# Patient Record
Sex: MALE | Race: WHITE | NOT HISPANIC OR LATINO | Employment: OTHER | URBAN - NONMETROPOLITAN AREA
[De-identification: names, ages, dates, MRNs, and addresses within clinical notes are randomized per-mention and may not be internally consistent; named-entity substitution may affect disease eponyms.]

---

## 2017-03-07 ENCOUNTER — TRANSCRIBE ORDERS (OUTPATIENT)
Dept: LAB | Facility: HOSPITAL | Age: 27
End: 2017-03-07

## 2017-03-07 ENCOUNTER — APPOINTMENT (OUTPATIENT)
Dept: LAB | Facility: HOSPITAL | Age: 27
End: 2017-03-07

## 2017-03-07 DIAGNOSIS — Z00.00 ROUTINE GENERAL MEDICAL EXAMINATION AT A HEALTH CARE FACILITY: Primary | ICD-10-CM

## 2017-03-07 LAB
ALBUMIN SERPL-MCNC: 4.4 G/DL (ref 3.5–5)
ALBUMIN/GLOB SERPL: 1.2 G/DL (ref 1.1–2.5)
ALP SERPL-CCNC: 74 U/L (ref 24–120)
ALT SERPL W P-5'-P-CCNC: 52 U/L (ref 0–54)
ANION GAP SERPL CALCULATED.3IONS-SCNC: 9 MMOL/L (ref 4–13)
AST SERPL-CCNC: 31 U/L (ref 7–45)
AUTO MIXED CELLS #: 0.5 10*3/UL (ref 0.1–2.6)
AUTO MIXED CELLS %: 7.2 % (ref 0.1–24)
BILIRUB SERPL-MCNC: 0.6 MG/DL (ref 0.1–1)
BUN BLD-MCNC: 15 MG/DL (ref 5–21)
BUN/CREAT SERPL: 17.6
CALCIUM SPEC-SCNC: 9.8 MG/DL (ref 8.4–10.4)
CHLORIDE SERPL-SCNC: 103 MMOL/L (ref 98–110)
CHOLEST SERPL-MCNC: 188 MG/DL (ref 130–200)
CO2 SERPL-SCNC: 30 MMOL/L (ref 24–31)
CREAT BLD-MCNC: 0.85 MG/DL (ref 0.5–1.4)
ERYTHROCYTE [DISTWIDTH] IN BLOOD BY AUTOMATED COUNT: 12.8 % (ref 12–15)
GFR SERPL CREATININE-BSD FRML MDRD: 108 ML/MIN/1.73
GLOBULIN UR ELPH-MCNC: 3.6 GM/DL
GLUCOSE BLD-MCNC: 113 MG/DL (ref 70–100)
HCT VFR BLD AUTO: 44.3 % (ref 40–52)
HDLC SERPL-MCNC: 52 MG/DL
HGB BLD-MCNC: 15.2 G/DL (ref 14–18)
LDLC SERPL CALC-MCNC: 118 MG/DL (ref 0–99)
LDLC/HDLC SERPL: 2.27 {RATIO}
LYMPHOCYTES # BLD AUTO: 1.7 10*3/MM3 (ref 0.8–7)
LYMPHOCYTES NFR BLD AUTO: 25.8 % (ref 15–45)
MCH RBC QN AUTO: 31 PG (ref 28–32)
MCHC RBC AUTO-ENTMCNC: 34.3 G/DL (ref 33–36)
MCV RBC AUTO: 90.4 FL (ref 82–95)
NEUTROPHILS # BLD AUTO: 4.5 10*3/MM3 (ref 1.5–8.3)
NEUTROPHILS NFR BLD AUTO: 67 % (ref 39–78)
PLATELET # BLD AUTO: 163 10*3/MM3 (ref 130–400)
PMV BLD AUTO: 10.6 FL (ref 6–12)
POTASSIUM BLD-SCNC: 5.4 MMOL/L (ref 3.5–5.3)
PROT SERPL-MCNC: 8 G/DL (ref 6.3–8.7)
RBC # BLD AUTO: 4.9 10*6/MM3 (ref 4.2–5.4)
SODIUM BLD-SCNC: 142 MMOL/L (ref 135–145)
TRIGL SERPL-MCNC: 89 MG/DL (ref 0–149)
VLDLC SERPL-MCNC: 17.8 MG/DL
WBC NRBC COR # BLD: 6.7 10*3/MM3 (ref 4.8–10.8)

## 2017-03-07 PROCEDURE — 80053 COMPREHEN METABOLIC PANEL: CPT

## 2017-03-07 PROCEDURE — 80061 LIPID PANEL: CPT

## 2017-03-07 PROCEDURE — 36415 COLL VENOUS BLD VENIPUNCTURE: CPT

## 2017-03-07 PROCEDURE — 85025 COMPLETE CBC W/AUTO DIFF WBC: CPT

## 2019-03-06 ENCOUNTER — LAB (OUTPATIENT)
Dept: LAB | Facility: HOSPITAL | Age: 29
End: 2019-03-06

## 2019-03-06 ENCOUNTER — TRANSCRIBE ORDERS (OUTPATIENT)
Dept: LAB | Facility: HOSPITAL | Age: 29
End: 2019-03-06

## 2019-03-06 DIAGNOSIS — R73.9 HYPERGLYCEMIA: ICD-10-CM

## 2019-03-06 DIAGNOSIS — Z00.00 ROUTINE GENERAL MEDICAL EXAMINATION AT HEALTH CARE FACILITY: ICD-10-CM

## 2019-03-06 DIAGNOSIS — R73.9 HYPERGLYCEMIA: Primary | ICD-10-CM

## 2019-03-06 LAB
ALBUMIN SERPL-MCNC: 4.5 G/DL (ref 3.5–5)
ALBUMIN/GLOB SERPL: 1.3 G/DL (ref 1.1–2.5)
ALP SERPL-CCNC: 64 U/L (ref 24–120)
ALT SERPL W P-5'-P-CCNC: 55 U/L (ref 0–54)
ANION GAP SERPL CALCULATED.3IONS-SCNC: 8 MMOL/L (ref 4–13)
AST SERPL-CCNC: 33 U/L (ref 7–45)
AUTO MIXED CELLS #: 0.6 10*3/MM3 (ref 0.1–2.6)
AUTO MIXED CELLS %: 8.1 % (ref 0.1–24)
BILIRUB SERPL-MCNC: 0.5 MG/DL (ref 0.1–1)
BUN BLD-MCNC: 17 MG/DL (ref 5–21)
BUN/CREAT SERPL: 20.5
CALCIUM SPEC-SCNC: 9.9 MG/DL (ref 8.4–10.4)
CHLORIDE SERPL-SCNC: 102 MMOL/L (ref 98–110)
CHOLEST SERPL-MCNC: 222 MG/DL (ref 130–200)
CO2 SERPL-SCNC: 31 MMOL/L (ref 24–31)
CREAT BLD-MCNC: 0.83 MG/DL (ref 0.5–1.4)
ERYTHROCYTE [DISTWIDTH] IN BLOOD BY AUTOMATED COUNT: 13.3 % (ref 12–15)
GFR SERPL CREATININE-BSD FRML MDRD: 110 ML/MIN/1.73
GLOBULIN UR ELPH-MCNC: 3.5 GM/DL
GLUCOSE BLD-MCNC: 113 MG/DL (ref 70–100)
HBA1C MFR BLD: 5.5 %
HCT VFR BLD AUTO: 46.6 % (ref 40–52)
HDLC SERPL-MCNC: 57 MG/DL
HGB BLD-MCNC: 16.1 G/DL (ref 14–18)
LDLC SERPL CALC-MCNC: 143 MG/DL (ref 0–99)
LDLC/HDLC SERPL: 2.51 {RATIO}
LYMPHOCYTES # BLD AUTO: 1.7 10*3/MM3 (ref 0.8–7)
LYMPHOCYTES NFR BLD AUTO: 22.7 % (ref 15–45)
MCH RBC QN AUTO: 31.9 PG (ref 28–32)
MCHC RBC AUTO-ENTMCNC: 34.5 G/DL (ref 33–36)
MCV RBC AUTO: 92.5 FL (ref 82–95)
NEUTROPHILS # BLD AUTO: 5.4 10*3/MM3 (ref 1.5–8.3)
NEUTROPHILS NFR BLD AUTO: 69.2 % (ref 39–78)
PLATELET # BLD AUTO: 160 10*3/MM3 (ref 130–400)
PMV BLD AUTO: 11.2 FL (ref 6–12)
POTASSIUM BLD-SCNC: 5.6 MMOL/L (ref 3.5–5.3)
PROT SERPL-MCNC: 8 G/DL (ref 6.3–8.7)
RBC # BLD AUTO: 5.04 10*6/MM3 (ref 4.2–5.4)
SODIUM BLD-SCNC: 141 MMOL/L (ref 135–145)
TRIGL SERPL-MCNC: 109 MG/DL (ref 0–149)
VLDLC SERPL-MCNC: 21.8 MG/DL
WBC NRBC COR # BLD: 7.7 10*3/MM3 (ref 4.8–10.8)

## 2019-03-06 PROCEDURE — 80061 LIPID PANEL: CPT

## 2019-03-06 PROCEDURE — 83036 HEMOGLOBIN GLYCOSYLATED A1C: CPT

## 2019-03-06 PROCEDURE — 85025 COMPLETE CBC W/AUTO DIFF WBC: CPT

## 2019-03-06 PROCEDURE — 80053 COMPREHEN METABOLIC PANEL: CPT

## 2019-03-06 PROCEDURE — 36415 COLL VENOUS BLD VENIPUNCTURE: CPT

## 2022-04-25 ENCOUNTER — LAB (OUTPATIENT)
Dept: LAB | Facility: HOSPITAL | Age: 32
End: 2022-04-25

## 2022-04-25 ENCOUNTER — OFFICE VISIT (OUTPATIENT)
Dept: FAMILY MEDICINE CLINIC | Facility: CLINIC | Age: 32
End: 2022-04-25

## 2022-04-25 VITALS
BODY MASS INDEX: 42.66 KG/M2 | SYSTOLIC BLOOD PRESSURE: 138 MMHG | WEIGHT: 315 LBS | RESPIRATION RATE: 20 BRPM | DIASTOLIC BLOOD PRESSURE: 80 MMHG | HEIGHT: 72 IN | OXYGEN SATURATION: 98 % | TEMPERATURE: 97.7 F | HEART RATE: 87 BPM

## 2022-04-25 DIAGNOSIS — E66.01 CLASS 3 SEVERE OBESITY DUE TO EXCESS CALORIES WITH SERIOUS COMORBIDITY AND BODY MASS INDEX (BMI) OF 40.0 TO 44.9 IN ADULT: ICD-10-CM

## 2022-04-25 DIAGNOSIS — Z11.59 ENCOUNTER FOR HEPATITIS C SCREENING TEST FOR LOW RISK PATIENT: ICD-10-CM

## 2022-04-25 DIAGNOSIS — R53.83 FATIGUE, UNSPECIFIED TYPE: ICD-10-CM

## 2022-04-25 DIAGNOSIS — Z72.0 TOBACCO USE: ICD-10-CM

## 2022-04-25 DIAGNOSIS — Z23 NEED FOR IMMUNIZATION AGAINST TETANUS ALONE: ICD-10-CM

## 2022-04-25 DIAGNOSIS — Z00.00 WELLNESS EXAMINATION: ICD-10-CM

## 2022-04-25 DIAGNOSIS — R73.09 ELEVATED GLUCOSE: Primary | ICD-10-CM

## 2022-04-25 DIAGNOSIS — R73.09 ELEVATED GLUCOSE: ICD-10-CM

## 2022-04-25 DIAGNOSIS — Z00.00 WELLNESS EXAMINATION: Primary | ICD-10-CM

## 2022-04-25 LAB
ALBUMIN SERPL-MCNC: 4.6 G/DL (ref 3.5–5)
ALBUMIN/GLOB SERPL: 1.4 G/DL (ref 1.1–2.5)
ALP SERPL-CCNC: 93 U/L (ref 24–120)
ALT SERPL W P-5'-P-CCNC: 81 U/L (ref 0–50)
ANION GAP SERPL CALCULATED.3IONS-SCNC: 4 MMOL/L (ref 4–13)
AST SERPL-CCNC: 40 U/L (ref 7–45)
AUTO MIXED CELLS #: 0.6 10*3/MM3 (ref 0.1–2.6)
AUTO MIXED CELLS %: 6.9 % (ref 0.1–24)
BILIRUB SERPL-MCNC: 0.6 MG/DL (ref 0.1–1)
BILIRUB UR QL STRIP: NEGATIVE
BUN SERPL-MCNC: 22 MG/DL (ref 5–21)
BUN/CREAT SERPL: 24.4
CALCIUM SPEC-SCNC: 10.1 MG/DL (ref 8.4–10.4)
CHLORIDE SERPL-SCNC: 105 MMOL/L (ref 98–110)
CHOLEST SERPL-MCNC: 232 MG/DL (ref 130–200)
CLARITY UR: CLEAR
CO2 SERPL-SCNC: 33 MMOL/L (ref 24–31)
COLOR UR: YELLOW
CREAT SERPL-MCNC: 0.9 MG/DL (ref 0.5–1.4)
EGFRCR SERPLBLD CKD-EPI 2021: 115.7 ML/MIN/1.73
ERYTHROCYTE [DISTWIDTH] IN BLOOD BY AUTOMATED COUNT: 13.1 % (ref 12.3–15.4)
GLOBULIN UR ELPH-MCNC: 3.4 GM/DL
GLUCOSE SERPL-MCNC: 134 MG/DL (ref 70–100)
GLUCOSE UR STRIP-MCNC: NEGATIVE MG/DL
HBA1C MFR BLD: 5.6 % (ref 4.8–5.9)
HCT VFR BLD AUTO: 47.1 % (ref 37.5–51)
HDLC SERPL-MCNC: 55 MG/DL
HGB BLD-MCNC: 15.6 G/DL (ref 13–17.7)
HGB UR QL STRIP.AUTO: NEGATIVE
KETONES UR QL STRIP: NEGATIVE
LDLC SERPL CALC-MCNC: 144 MG/DL (ref 0–99)
LDLC/HDLC SERPL: 2.56 {RATIO}
LEUKOCYTE ESTERASE UR QL STRIP.AUTO: NEGATIVE
LYMPHOCYTES # BLD AUTO: 1.8 10*3/MM3 (ref 0.7–3.1)
LYMPHOCYTES NFR BLD AUTO: 20.1 % (ref 19.6–45.3)
MCH RBC QN AUTO: 31.8 PG (ref 26.6–33)
MCHC RBC AUTO-ENTMCNC: 33.1 G/DL (ref 31.5–35.7)
MCV RBC AUTO: 95.9 FL (ref 79–97)
NEUTROPHILS NFR BLD AUTO: 6.5 10*3/MM3 (ref 1.7–7)
NEUTROPHILS NFR BLD AUTO: 73 % (ref 42.7–76)
NITRITE UR QL STRIP: NEGATIVE
PH UR STRIP.AUTO: 6 [PH] (ref 5–8)
PLATELET # BLD AUTO: 179 10*3/MM3 (ref 140–450)
PMV BLD AUTO: 10.9 FL (ref 6–12)
POTASSIUM SERPL-SCNC: 5.2 MMOL/L (ref 3.5–5.3)
PROT SERPL-MCNC: 8 G/DL (ref 6.3–8.7)
PROT UR QL STRIP: NEGATIVE
RBC # BLD AUTO: 4.91 10*6/MM3 (ref 4.14–5.8)
SODIUM SERPL-SCNC: 142 MMOL/L (ref 135–145)
SP GR UR STRIP: 1.02 (ref 1–1.03)
TRIGL SERPL-MCNC: 182 MG/DL (ref 0–149)
UROBILINOGEN UR QL STRIP: NORMAL
VLDLC SERPL-MCNC: 33 MG/DL (ref 5–40)
WBC NRBC COR # BLD: 8.9 10*3/MM3 (ref 3.4–10.8)

## 2022-04-25 PROCEDURE — 90715 TDAP VACCINE 7 YRS/> IM: CPT | Performed by: NURSE PRACTITIONER

## 2022-04-25 PROCEDURE — 2014F MENTAL STATUS ASSESS: CPT | Performed by: NURSE PRACTITIONER

## 2022-04-25 PROCEDURE — 85025 COMPLETE CBC W/AUTO DIFF WBC: CPT

## 2022-04-25 PROCEDURE — 80061 LIPID PANEL: CPT

## 2022-04-25 PROCEDURE — 81003 URINALYSIS AUTO W/O SCOPE: CPT

## 2022-04-25 PROCEDURE — 99385 PREV VISIT NEW AGE 18-39: CPT | Performed by: NURSE PRACTITIONER

## 2022-04-25 PROCEDURE — 84403 ASSAY OF TOTAL TESTOSTERONE: CPT

## 2022-04-25 PROCEDURE — 36415 COLL VENOUS BLD VENIPUNCTURE: CPT

## 2022-04-25 PROCEDURE — DOTPHY: Performed by: NURSE PRACTITIONER

## 2022-04-25 PROCEDURE — 83036 HEMOGLOBIN GLYCOSYLATED A1C: CPT

## 2022-04-25 PROCEDURE — 80053 COMPREHEN METABOLIC PANEL: CPT

## 2022-04-25 PROCEDURE — 90471 IMMUNIZATION ADMIN: CPT | Performed by: NURSE PRACTITIONER

## 2022-04-25 PROCEDURE — 84402 ASSAY OF FREE TESTOSTERONE: CPT

## 2022-04-25 NOTE — PROGRESS NOTES
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 Needing labs and screenings completed.            I have reviewed and agree with the HPI information as above.  Mely Raymundo, APRN     Objective   Vital Signs:   /80   Pulse 87   Temp 97.7 °F (36.5 °C)   Resp 20   Ht 182.9 cm (72\")   Wt (!) 143 kg (316 lb)   SpO2 98%   BMI 42.86 kg/m²     Patient's Body mass index is 42.86 kg/m². indicating that he is morbidly obese (BMI > 40 or > 35 with obesity - related health condition). Obesity-related health conditions include the following: none. Obesity is newly identified. BMI is is above average; BMI management plan is completed. We discussed portion control and increasing exercise..      Physical Exam  Vitals and nursing note reviewed.   Constitutional:       Appearance: Normal appearance. He is well-developed.   HENT:      Head: Normocephalic and atraumatic.      Right Ear: Tympanic membrane, ear canal and external ear normal.      Left Ear: Tympanic membrane, ear canal and external ear normal.      Nose: Nose normal. No septal deviation, nasal tenderness or congestion.      Mouth/Throat:      Lips: Pink. No lesions.      Mouth: Mucous membranes are moist. No oral lesions.      Dentition: Normal dentition.      Pharynx: Oropharynx is clear. No pharyngeal swelling, oropharyngeal exudate or posterior oropharyngeal erythema.   Eyes:      General: Lids are normal. Vision grossly intact. No scleral icterus.        Right eye: No discharge.         Left eye: No discharge.      Extraocular Movements: Extraocular movements intact.      Conjunctiva/sclera: Conjunctivae normal.      Right eye: Right conjunctiva is not injected.      Left eye: Left conjunctiva is not injected.      Pupils: Pupils are equal, round, and reactive to light. "   Neck:      Thyroid: No thyroid mass.      Trachea: Trachea normal.   Cardiovascular:      Rate and Rhythm: Normal rate and regular rhythm.      Heart sounds: Normal heart sounds. No murmur heard.    No gallop.   Pulmonary:      Effort: Pulmonary effort is normal.      Breath sounds: Normal breath sounds and air entry. No wheezing, rhonchi or rales.   Musculoskeletal:         General: No tenderness or deformity. Normal range of motion.      Cervical back: Full passive range of motion without pain, normal range of motion and neck supple.      Thoracic back: Normal.      Right lower leg: No edema.      Left lower leg: No edema.   Skin:     General: Skin is warm and dry.      Coloration: Skin is not jaundiced.      Findings: No rash.   Neurological:      Mental Status: He is alert and oriented to person, place, and time.      Cranial Nerves: Cranial nerves are intact.      Sensory: Sensation is intact.      Motor: Motor function is intact.      Coordination: Coordination is intact.      Gait: Gait is intact.      Deep Tendon Reflexes: Reflexes are normal and symmetric.   Psychiatric:         Mood and Affect: Mood and affect normal.         Behavior: Behavior normal.         Judgment: Judgment normal.          KARAN-7:      PHQ-2 Depression Screening  Little interest or pleasure in doing things? 0-->not at all   Feeling down, depressed, or hopeless? 0-->not at all   PHQ-2 Total Score 0     PHQ-9 Depression Screening  Little interest or pleasure in doing things? 0-->not at all   Feeling down, depressed, or hopeless? 0-->not at all   Trouble falling or staying asleep, or sleeping too much?     Feeling tired or having little energy?     Poor appetite or overeating?     Feeling bad about yourself - or that you are a failure or have let yourself or your family down?     Trouble concentrating on things, such as reading the newspaper or watching television?     Moving or speaking so slowly that other people could have noticed?  Or the opposite - being so fidgety or restless that you have been moving around a lot more than usual?     Thoughts that you would be better off dead, or of hurting yourself in some way?     PHQ-9 Total Score 0   If you checked off any problems, how difficult have these problems made it for you to do your work, take care of things at home, or get along with other people?        Result Review  Data Reviewed:                   Assessment and Plan      Problem List Items Addressed This Visit    None     Visit Diagnoses     Wellness examination    -  Primary    Relevant Orders    CBC Auto Differential (Completed)    Comprehensive Metabolic Panel (Completed)    Lipid Panel (Completed)    TSH    Urinalysis With Culture If Indicated - Urine, Clean Catch (Completed)    Tobacco use        Encounter for hepatitis C screening test for low risk patient        Relevant Orders    Hepatitis C antibody    Fatigue, unspecified type        Relevant Orders    Testosterone, Free, Total (Completed)    Need for immunization against tetanus alone        Class 3 severe obesity due to excess calories with serious comorbidity and body mass index (BMI) of 40.0 to 44.9 in adult (HCC)          Yearly exam and DOT physical completed.   Labs today.   Cleared for 2 years.   Patient would like his testosterone checked.         Follow Up   Return in about 1 year (around 4/25/2023) for Annual physical.  Patient was given instructions and counseling regarding his condition or for health maintenance advice. Please see specific information pulled into the AVS if appropriate.     The following counseling and eduction was discussed with the patient and will print with AVS.    Anticipatory Guidance/Psychosocial Screening - to include:  • Second hand smoke  • Tobacco Use counseling  • Substance abuse counseling  • Exercise   • At least 800-1,000 units of vitamin D daily and consideration of screening in persons with low sun exposure or other risk    factors  • 1,200 mg. of calcium daily in adults 50 years and older.   • Folic acid (0.4mg to 0.8 mg/day for females of reproductive age (USPSTF - A Recommendation) The USPSTF   recommends that all women who are planning or capable of pregnancy take a daily supplement containing 0.4 to 0.8 mg   (400 to 800 µg) of folic acid.     • Aspirin use -  for the primary prevention of cardiovascular   disease (CVD) and colorectal cancer (CRC) in adults aged 50 to 59 years who have a 10% or greater 10-year CVD risk, are   not at increased risk for bleeding, have a life expectancy of at least 10 years, and are willing to take low-dose aspirin daily   for at least 10 years.   • Discussion of risks and benefits of hormone replacement prophylaxis and alternative therapies in women  • Polypharmacy  • Safe sex/STD High-intensity behavioral counseling to prevent sexually transmitted infections for all adults at increased   risk for STIs. “High- intensity” behavior counseling is defined by USPSTF as multiple sessions of behavioral counseling   providing some provision of education, skill training or support from changes in sexual behavior that promotes risk   reduction and avoidance. USPSTF - B Recommendation  • Limit exposure of UV light  • Oral health  .•Counseling for Diet  Safety Issues - to include:  Anticipatory Guidance/Safety Issues References  • Domestic Violence: The U.S. Preventive Services Task Force (USPSTF) recommends that clinicians screen women of   childbearing age for intimate partner violence (IPV), such as domestic violence, and provide or refer women who screen   positive to intervention services. This recommendation applies to women who do not have signs or symptoms of abuse.   • Woman Abuse Screening Tool (WAST)   • Personalized Safety Plan   • Smoke and carbon monoxide detectors  • Firearms use and safe storage of  • Appropriate protective/safety equipment for such activities as biking, skating and skiing  •  Seat belt use    Patient was given instructions and counseling regarding his/her condition or for health maintenance advice. Please see specific information pulled into the AVS if appropriate.

## 2022-04-25 NOTE — PROGRESS NOTES
After obtaining consent, and per orders of Leisa WELCH, injection of Tdap 0.5ml administered to R deltoid IM. Given by Cherelle Marie MA. Patient instructed to remain in clinic for 20 minutes afterwards, and to report any adverse reaction to me immediately. Pt tolerated well with no adverse reactions.

## 2022-04-28 LAB
TESTOST FREE SERPL-MCNC: 11.6 PG/ML (ref 8.7–25.1)
TESTOST SERPL-MCNC: 372 NG/DL (ref 264–916)

## 2022-05-03 ENCOUNTER — TELEPHONE (OUTPATIENT)
Dept: FAMILY MEDICINE CLINIC | Facility: CLINIC | Age: 32
End: 2022-05-03

## 2022-05-03 DIAGNOSIS — E78.00 HYPERCHOLESTEREMIA: Primary | ICD-10-CM

## 2022-05-03 RX ORDER — ATORVASTATIN CALCIUM 10 MG/1
10 TABLET, FILM COATED ORAL DAILY
Qty: 30 TABLET | Refills: 2 | Status: SHIPPED | OUTPATIENT
Start: 2022-05-03

## 2022-05-03 NOTE — TELEPHONE ENCOUNTER
----- Message from YURI Huertas sent at 5/3/2022  9:34 AM CDT -----  HgbA1c is normal  Cholesterol  and trigs are elevated. Start lipitor 10 mg daily  Very mild renal insufficiency. Hydrate and hold NSAIDS if he is taking any. Repeat labs in 1 month  Testosterone is low normal. If he is wanting injections, we need to due the full testosterone panel. He can do this with his CMP recheck if he likes. Tell him to do labs 1st thing in the morning.

## 2022-05-03 NOTE — TELEPHONE ENCOUNTER
Called patient with results HgbA1c is normal.Cholesterol  and trigs are elevated. Start lipitor 10 mg daily. Prescription sent to pharmacy.Very mild renal insufficiency. Hydrate and hold NSAIDS Pt states he is currently taking NSAIDS but will stop and agrees to repeat labs in 1 month. Orders in.Testosterone is low normal. If he is wanting injections, we need to due the full testosterone panel. He states he does not wish to do the testosterone panel at this time. NIA.

## 2022-06-16 ENCOUNTER — TELEPHONE (OUTPATIENT)
Dept: FAMILY MEDICINE CLINIC | Facility: CLINIC | Age: 32
End: 2022-06-16

## 2022-06-16 NOTE — TELEPHONE ENCOUNTER
Spoke with pt in regards to outstanding lab orders. States he will come in a few weeks to complete.

## 2022-07-13 ENCOUNTER — TELEPHONE (OUTPATIENT)
Dept: FAMILY MEDICINE CLINIC | Facility: CLINIC | Age: 32
End: 2022-07-13

## 2023-02-02 ENCOUNTER — HOSPITAL ENCOUNTER (INPATIENT)
Age: 33
LOS: 1 days | Discharge: HOME | DRG: 101 | End: 2023-02-03
Attending: INTERNAL MEDICINE | Admitting: HOSPITALIST

## 2023-02-02 ENCOUNTER — APPOINTMENT (OUTPATIENT)
Dept: GENERAL RADIOLOGY | Age: 33
DRG: 101 | End: 2023-02-02
Attending: INTERNAL MEDICINE

## 2023-02-02 ENCOUNTER — APPOINTMENT (OUTPATIENT)
Dept: MRI IMAGING | Age: 33
DRG: 101 | End: 2023-02-02
Attending: INTERNAL MEDICINE

## 2023-02-02 DIAGNOSIS — Z86.69 HISTORY OF SEIZURE DISORDER: Primary | ICD-10-CM

## 2023-02-02 PROBLEM — R41.0 CONFUSION: Status: ACTIVE | Noted: 2023-02-02

## 2023-02-02 PROBLEM — R41.82 ALTERED MENTAL STATUS: Status: ACTIVE | Noted: 2023-02-02

## 2023-02-02 PROBLEM — M54.9 UPPER BACK PAIN: Status: ACTIVE | Noted: 2023-02-02

## 2023-02-02 LAB
ALBUMIN SERPL-MCNC: 4 G/DL (ref 3.5–5.2)
ALP BLD-CCNC: 68 U/L (ref 40–130)
ALT SERPL-CCNC: 27 U/L (ref 5–41)
ANION GAP SERPL CALCULATED.3IONS-SCNC: 9 MMOL/L (ref 7–19)
AST SERPL-CCNC: 22 U/L (ref 5–40)
BILIRUB SERPL-MCNC: <0.2 MG/DL (ref 0.2–1.2)
BUN BLDV-MCNC: 11 MG/DL (ref 6–20)
CALCIUM SERPL-MCNC: 9 MG/DL (ref 8.6–10)
CHLORIDE BLD-SCNC: 105 MMOL/L (ref 98–111)
CO2: 24 MMOL/L (ref 22–29)
CREAT SERPL-MCNC: 0.6 MG/DL (ref 0.5–1.2)
GFR SERPL CREATININE-BSD FRML MDRD: >60 ML/MIN/{1.73_M2}
GLUCOSE BLD-MCNC: 99 MG/DL (ref 74–109)
HCT VFR BLD CALC: 44 % (ref 42–52)
HEMOGLOBIN: 14.9 G/DL (ref 14–18)
LACTIC ACID: 1.1 MMOL/L (ref 0.5–1.9)
MAGNESIUM: 2 MG/DL (ref 1.6–2.6)
MCH RBC QN AUTO: 32.2 PG (ref 27–31)
MCHC RBC AUTO-ENTMCNC: 33.9 G/DL (ref 33–37)
MCV RBC AUTO: 95 FL (ref 80–94)
PDW BLD-RTO: 13.3 % (ref 11.5–14.5)
PLATELET # BLD: 208 K/UL (ref 130–400)
PMV BLD AUTO: 11.5 FL (ref 9.4–12.4)
POTASSIUM REFLEX MAGNESIUM: 3.9 MMOL/L (ref 3.5–5)
RBC # BLD: 4.63 M/UL (ref 4.7–6.1)
SODIUM BLD-SCNC: 138 MMOL/L (ref 136–145)
TOTAL PROTEIN: 6.9 G/DL (ref 6.6–8.7)
TROPONIN: <0.01 NG/ML (ref 0–0.03)
TSH SERPL DL<=0.05 MIU/L-ACNC: 0.73 UIU/ML (ref 0.27–4.2)
WBC # BLD: 13.2 K/UL (ref 4.8–10.8)

## 2023-02-02 PROCEDURE — 83605 ASSAY OF LACTIC ACID: CPT

## 2023-02-02 PROCEDURE — 6370000000 HC RX 637 (ALT 250 FOR IP): Performed by: PSYCHIATRY & NEUROLOGY

## 2023-02-02 PROCEDURE — 84484 ASSAY OF TROPONIN QUANT: CPT

## 2023-02-02 PROCEDURE — 84443 ASSAY THYROID STIM HORMONE: CPT

## 2023-02-02 PROCEDURE — 93880 EXTRACRANIAL BILAT STUDY: CPT

## 2023-02-02 PROCEDURE — G0378 HOSPITAL OBSERVATION PER HR: HCPCS

## 2023-02-02 PROCEDURE — A9577 INJ MULTIHANCE: HCPCS | Performed by: PSYCHIATRY & NEUROLOGY

## 2023-02-02 PROCEDURE — G0379 DIRECT REFER HOSPITAL OBSERV: HCPCS

## 2023-02-02 PROCEDURE — 99223 1ST HOSP IP/OBS HIGH 75: CPT | Performed by: PSYCHIATRY & NEUROLOGY

## 2023-02-02 PROCEDURE — 36415 COLL VENOUS BLD VENIPUNCTURE: CPT

## 2023-02-02 PROCEDURE — 83735 ASSAY OF MAGNESIUM: CPT

## 2023-02-02 PROCEDURE — 85027 COMPLETE CBC AUTOMATED: CPT

## 2023-02-02 PROCEDURE — 70553 MRI BRAIN STEM W/O & W/DYE: CPT | Performed by: RADIOLOGY

## 2023-02-02 PROCEDURE — 93005 ELECTROCARDIOGRAM TRACING: CPT | Performed by: NURSE PRACTITIONER

## 2023-02-02 PROCEDURE — 95819 EEG AWAKE AND ASLEEP: CPT | Performed by: PSYCHIATRY & NEUROLOGY

## 2023-02-02 PROCEDURE — 70553 MRI BRAIN STEM W/O & W/DYE: CPT

## 2023-02-02 PROCEDURE — 6360000002 HC RX W HCPCS: Performed by: NURSE PRACTITIONER

## 2023-02-02 PROCEDURE — 72070 X-RAY EXAM THORAC SPINE 2VWS: CPT | Performed by: RADIOLOGY

## 2023-02-02 PROCEDURE — 80053 COMPREHEN METABOLIC PANEL: CPT

## 2023-02-02 PROCEDURE — 6360000004 HC RX CONTRAST MEDICATION: Performed by: PSYCHIATRY & NEUROLOGY

## 2023-02-02 PROCEDURE — 1210000000 HC MED SURG R&B

## 2023-02-02 PROCEDURE — 72070 X-RAY EXAM THORAC SPINE 2VWS: CPT

## 2023-02-02 RX ORDER — POLYETHYLENE GLYCOL 3350 17 G/17G
17 POWDER, FOR SOLUTION ORAL DAILY PRN
Status: DISCONTINUED | OUTPATIENT
Start: 2023-02-02 | End: 2023-02-03 | Stop reason: HOSPADM

## 2023-02-02 RX ORDER — ASPIRIN 81 MG/1
81 TABLET ORAL DAILY
Status: DISCONTINUED | OUTPATIENT
Start: 2023-02-03 | End: 2023-02-03 | Stop reason: HOSPADM

## 2023-02-02 RX ORDER — LEVETIRACETAM 500 MG/1
500 TABLET ORAL 2 TIMES DAILY
Status: DISCONTINUED | OUTPATIENT
Start: 2023-02-02 | End: 2023-02-03 | Stop reason: HOSPADM

## 2023-02-02 RX ORDER — ONDANSETRON 2 MG/ML
4 INJECTION INTRAMUSCULAR; INTRAVENOUS EVERY 6 HOURS PRN
Status: DISCONTINUED | OUTPATIENT
Start: 2023-02-02 | End: 2023-02-03 | Stop reason: HOSPADM

## 2023-02-02 RX ORDER — ENOXAPARIN SODIUM 100 MG/ML
40 INJECTION SUBCUTANEOUS DAILY
Status: DISCONTINUED | OUTPATIENT
Start: 2023-02-02 | End: 2023-02-02 | Stop reason: DRUGHIGH

## 2023-02-02 RX ORDER — ASPIRIN 300 MG/1
300 SUPPOSITORY RECTAL DAILY
Status: DISCONTINUED | OUTPATIENT
Start: 2023-02-03 | End: 2023-02-03 | Stop reason: HOSPADM

## 2023-02-02 RX ORDER — LORAZEPAM 2 MG/ML
1 INJECTION INTRAMUSCULAR EVERY 5 MIN PRN
Status: DISCONTINUED | OUTPATIENT
Start: 2023-02-02 | End: 2023-02-03 | Stop reason: HOSPADM

## 2023-02-02 RX ORDER — ENOXAPARIN SODIUM 100 MG/ML
30 INJECTION SUBCUTANEOUS 2 TIMES DAILY
Status: DISCONTINUED | OUTPATIENT
Start: 2023-02-02 | End: 2023-02-03 | Stop reason: HOSPADM

## 2023-02-02 RX ORDER — KETOROLAC TROMETHAMINE 30 MG/ML
30 INJECTION, SOLUTION INTRAMUSCULAR; INTRAVENOUS ONCE
Status: COMPLETED | OUTPATIENT
Start: 2023-02-02 | End: 2023-02-02

## 2023-02-02 RX ORDER — ONDANSETRON 4 MG/1
4 TABLET, ORALLY DISINTEGRATING ORAL EVERY 8 HOURS PRN
Status: DISCONTINUED | OUTPATIENT
Start: 2023-02-02 | End: 2023-02-03 | Stop reason: HOSPADM

## 2023-02-02 RX ADMIN — KETOROLAC TROMETHAMINE 30 MG: 30 INJECTION, SOLUTION INTRAMUSCULAR; INTRAVENOUS at 18:06

## 2023-02-02 RX ADMIN — ENOXAPARIN SODIUM 30 MG: 100 INJECTION SUBCUTANEOUS at 18:06

## 2023-02-02 RX ADMIN — GADOBENATE DIMEGLUMINE 20 ML: 529 INJECTION, SOLUTION INTRAVENOUS at 17:53

## 2023-02-02 RX ADMIN — LEVETIRACETAM 500 MG: 500 TABLET, FILM COATED ORAL at 20:48

## 2023-02-02 ASSESSMENT — PAIN - FUNCTIONAL ASSESSMENT: PAIN_FUNCTIONAL_ASSESSMENT: ACTIVITIES ARE NOT PREVENTED

## 2023-02-02 ASSESSMENT — PAIN SCALES - GENERAL: PAINLEVEL_OUTOF10: 6

## 2023-02-02 ASSESSMENT — PAIN DESCRIPTION - ORIENTATION: ORIENTATION: UPPER

## 2023-02-02 ASSESSMENT — PAIN DESCRIPTION - DESCRIPTORS: DESCRIPTORS: ACHING

## 2023-02-02 ASSESSMENT — PAIN DESCRIPTION - LOCATION: LOCATION: BACK

## 2023-02-02 NOTE — PROGRESS NOTES
4 Eyes Skin Assessment    Rogelio Roe is being assessed upon: Admission    I agree that I, Aurelio Sever, RN, along with Christie Palomino (either 2 RN's or 1 LPN and 1 RN) have performed a thorough Head to Toe Skin Assessment on the patient. ALL assessment sites listed below have been assessed. Areas assessed by both nurses:     [x]   Head, Face, and Ears   [x]   Shoulders, Back, and Chest  [x]   Arms, Elbows, and Hands   [x]   Coccyx, Sacrum, and Ischium  [x]   Legs, Feet, and Heels    Does the Patient have Skin Breakdown?  No    Angel Prevention initiated: No  Wound Care Orders initiated: No    WO nurse consulted for Pressure Injury (Stage 3,4, Unstageable, DTI, NWPT, and Complex wounds) and New or Established Ostomies: No        Primary Nurse eSignature: Aurelio Sever, RN on 2/2/2023 at 2:50 PM      Co-Signer eSignature: {Esignature:[id number]}

## 2023-02-02 NOTE — PROGRESS NOTES
Automatic Dose Adjustment of                Subcutaneous Anticoagulant for Prophylaxis    Artice Staff is a 34 y.o. male. Recent Labs     02/02/23  1431   CREATININE 0.6       Estimated Creatinine Clearance: 240 mL/min (based on SCr of 0.6 mg/dL). Weight:  Wt Readings from Last 1 Encounters:   02/02/23 278 lb 4.8 oz (126.2 kg)           Pharmacy has adjusted subcutaneous anticoagulant for prophylaxis to Enoxaparin 30 mg SC twice daily based on the patient's weight and estimated CrCl per [facility] policy.                Electronically signed by Rahul Olivarez, [address] [place] [address] on 2/2/2023 at 4:48 PM

## 2023-02-02 NOTE — PROGRESS NOTES
STROKE ADMISSION    Date of Note:  2/2/2023  Time of Note:  2:51 PM    Patient Name:  Kadie Arce  MRN:  [id number]  YOB: 1990  Age:      34 y.o. Gender:      male    Room:  [address] [place] [address]   Adm. Date: 2/2/2023  Adm. Status:   Allergies: Patient has no known allergies. Code Status: Full Code    Adm. Provider: Cody Ernst MD  Neuro. Provider: Dr. Kizzy Elizalde    Bedside report and handoff assessment completed with Kunal Wolfe RN. Presentation(s)    ED Chief Complaint  No chief complaint on file. ED Impression  No diagnosis found. Admission Impression  Principal Problem:    Altered mental status  Active Problems:    History of seizure disorder  Resolved Problems:    * No resolved hospital problems. *                  Last Known Well Date & Time         Current NIHSS:       Current GCS:  [place] Coma Scale  Eye Opening: Spontaneous  Best Verbal Response: Oriented  Best Motor Response: Obeys commands  [place] Coma Scale Score: 15      Education & Care Plan    [x]    Education Assessment Completed      Patient preferred method of education:   [x]    Visual   [x]    Written   []    Verbal   []    Demonstration   [x]    All of the above    [x]    Individualized Stroke/TIA Education template added, including patient specific risk factors: Overweight    [x]    Stroke education initiated with patient and/or caregiver. [x]    Stroke booklet given and reviewed completely and efficiently with patient and/or caregiver. All questions and concerns addressed. Will continue to educate appropriately.     [x]    Individualized Stroke/TIA Care Plan added      [place] Swallow Screen (YSS)    [x]    Bedside swallow screen completed using the YSS Protocol, and documented prior to any PO meds, food, or drink:     []    Completed in ED    [x]    Passed    []    Failed and awaiting SLP eval     []    Completed upon admission to this unit    []    Passed    []    Failed and awaiting SLP eval     []    Patient strict NPO status for procedure/testing      [x]  NIHSS on admission              VTE Prophylaxis  (ASA, Plavix and tPA are not VTE prophylaxis)      SCDs   [x]    On   []    Off   []    Refused   []    Contraindicated see MD orders      Medication(s)   [x]    Lovenox   []    Eliquis   []    Heparin   []    Coumadin/Warfarin   []    Other:  n/a   []    Contraindicated see MD orders      [x]    I attest as the admitting nurse that I have completed a thorough stroke assessment and admission on this patient. All checked assessment areas listed above have been addressed and completed.     Nurse eSignature:  Jagjit Shannon RN  Date:   2/2/2023   Time:   2:51 PM

## 2023-02-02 NOTE — PROCEDURES
ADULT INPATIENT ELECTROENCEPHALOGRAM REPORT    Patient:   Ginger Smith  MR#:    [id number]  Room #:    INPATIENT  YOB: 1990  Date of Evaluation:  2/2/2023  Referring Physician:   Adriano Segal DO      CLINICAL INFORMATION:     This patient is a 34 y.o. male with a history of possible seizure. MEDICATIONS:     See MAR. RECORDING CONDITIONS:     This EEG was performed utilizing standard International 10-20 System of electrode placement, with additional channels monitored for eye movement. One channel electrocardiogram was monitored. Data was obtained, stored, and interpreted according to ACNS guidelines (J Clin Neurophysiol 2006;23(2):) utilizing referential montage recording, with reformatting to longitudinal, transverse bipolar, and referential montages as necessary for interpretation, along with the digital/automated EEG analysis. Patient tolerated entire procedure well. Photic stimulation and hyperventilation were utilized as activation procedures unless otherwise specified below. E.E.G. DESCRIPTION:     The resting predominant posterior background frequency is a 9-10 Hz 30-40 uV rhythm. Intermittent generalized frontally predominant 3 to 4 Hz spike and polyspike and wave discharges were noted. Intermixed sleep architecture was noted. Hyperventilation was not performed. Photic stimulation was not performed. No ECG abnormalities were noted. Muscle, motion, and eye movement artifacts were noted. EEG INTERPRETATION:    Abnormal EEG due to intermittent frontally predominant generalized 3 to 4 Hz spike and polyspike and wave discharges. 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        Adriano Segal DO  Board Certified Neurologist    Date reported: 2/2/2023  Date signed: 2/2/2023

## 2023-02-02 NOTE — CONSULTS
[id number] [facility] Neurology Consult      Patient:   Betty Parra  MR#:    [id number]  Account Number:                   [id number]      Room:    [address] [place] [address]   YOB: 1990  Date of Progress Note: 2/2/2023  Time of Note                           4:29 PM  Attending Physician:  Chary Dillon MD  Consulting Physician:  Devyn Celis DO       CHIEF COMPLAINT:  Possible seizure     HISTORY OF PRESENT ILLNESS:   This is a 34 y.o. male who was admitted with a confusional episode. The patient states that he became confused, noted difficulty with his speech. Then he lost basically a block of time where he does not recall what took place. He did appear confused during this but did not exhibit any generalized tonic-clonic activity. He did bite the right side of his tongue during this. There was no incontinence. He does have a prior seizure history remotely during childhood. He is not on antiepileptic medications. He denies a history of traumatic brain injury, meningitis or encephalitis. No family history of seizures. He denies facial drooping, focal weakness or numbness. He denies headaches or visual changes. No significant drug or alcohol history noted. REVIEW OF SYSTEMS:  Constitutional - No fever or chills. HENT -  No Scalp tenderness. No tinnitus or significant hearing loss. No nose bleeding, no sore throat. Eyes - No sudden vision change or eye pain  Respiratory - No significant shortness of breath or cough  Cardiovascular - No chest pain. No palpitations or significant leg swelling  Gastrointestinal - No abdominal swelling or pain. Genitourinary - No difficulty urinating, dysuria  Musculoskeletal - No back pain or myalgia. Skin - No color change or rash  Neurologic - No lateralizing weakness. No numbness. Hematologic - No easy bruising or spontaneous bleeding. Psychiatric - No anxiety.      PAST MEDICAL HISTORY:      Diagnosis Date    Absence seizure disorder ([facility] [address]     Altered mental status        PAST SURGICAL HISTORY:  No past surgical history on file. SOCIAL HISTORY:   TOBACCO:   has no history on file for tobacco use. ETOH:   has no history on file for alcohol use. DRUG:    Social History     Substance and Sexual Activity   Drug Use Not on file       FAMILY HISTORY:   No family history on file. MEDICATIONS:      Current Facility-Administered Medications:     ondansetron (ZOFRAN-ODT) disintegrating tablet 4 mg, 4 mg, Oral, Q8H PRN **OR** ondansetron (ZOFRAN) injection 4 mg, 4 mg, IntraVENous, Q6H PRN, [facility] - CNP    polyethylene glycol (GLYCOLAX) packet 17 g, 17 g, Oral, Daily PRN, [facility]    enoxaparin (LOVENOX) injection 40 mg, 40 mg, SubCUTAneous, Daily, [facility]    [START ON 2/3/2023] aspirin EC tablet 81 mg, 81 mg, Oral, Daily **OR** [START ON 2/3/2023] aspirin suppository 300 mg, 300 mg, Rectal, Daily, [facility]    LORazepam (ATIVAN) injection 1 mg, 1 mg, IntraVENous, Q5 Min PRN, [facility]    ketorolac (TORADOL) injection 30 mg, 30 mg, IntraVENous, Once, [facility]    ALLERGIES:    Patient has no known allergies. PHYSICAL EXAM:    Constitutional -   BP (!) 158/98   Pulse 99   Temp 98.4 °F (36.9 °C) (Temporal)   Resp 16   SpO2 97%   General appearance: No acute distress   EYES -   Conjunctiva normal  Pupillary exam as below, see CN exam in the neurologic exam  ENT-    No scars, masses, or lesions over external nose or ears  Oropharynx without erythema, palate midline  Cardiovascular -   No clubbing, cyanosis, or edema   Pulmonary-   Good expansion, normal effort without use of accessory muscles  Musculoskeletal -   No significant wasting of muscles noted  Gait as below, see gait exam in the neurologic exam  Muscle strength, tone, stability as below see the motor exam in the neurologic exam.   No bony deformities  Skin -   Warm, dry, and intact to inspection and palpation.     No rash, erythema, or pallor  Psychiatric -   Mood, affect, and behavior appear normal    Memory as below see mental status examination in the neurologic exam      NEUROLOGICAL EXAM    Mental status   [x] Awake, alert, oriented   [x] Affect attention and concentration appear appropriate  [x] Recent and remote memory appears unremarkable  [x] Speech normal without dysarthria or aphasia, comprehension and repetition intact. COMMENTS:   Cranial Nerves [x] No VF deficit to confrontation  [x] PERRLA, EOMI, no nystagmus, conjugate eye movements, no ptosis  [x] Face symmetric  [x] Facial sensation intact  [x] Tongue midline no atrophy or fasciculations present  [x] Palate midline, hearing to finger rub normal  [x] Shoulder shrug and SCM testing normal  COMMENTS:   Motor   [x] 5/5 strength x 4 extremities  [x] Normal bulk and tone  [x] No tremor present  [x] No rigidity or bradykinesia noted  COMMENTS:   Sensory  [x] Sensation intact to light touch, pin prick, vibration, and proprioception BLE  [] Sensation intact to light touch, pin prick, vibration, and proprioception BUE  COMMENTS:   Coordination [x] FTN normal bilaterally   [] HTS normal bilaterally  [] SILVIA normal.   COMMENTS:   Reflexes  [x] Symmetric and non-pathological  [x] Toes downgoing bilaterally  [x] No clonus present  COMMENTS:   Gait                  [] Normal steady gait    [] Ataxic    [] Spastic     [] Magnetic     [] Shuffling  [x] Not assessed  COMMENTS:       LABS/IMAGING:    As below and per HPI    Recent Labs     02/02/23  1431   WBC 13.2*   HGB 14.9        Recent Labs     02/02/23  1431      K 3.9      CO2 24   BUN 11   CREATININE 0.6   GLUCOSE 99     Recent Labs     02/02/23  1431   AST 22   ALT 27   BILITOT <0.2   ALKPHOS 68         ASSESSMENT:  34 y.o. admitted with a confusional episode with tongue biting, EEG is abnormal, suspect seizure. Remote history of seizures during childhood, likely has SANA given EEG appearance. PLAN:   MRI brain   Start Keppra 500 mg bid    Epilepsy precautions and seizure first aid discussed. No driving, heights, swimming, tub baths, open flames, or heavy machinery. Ativan prn, seizure precautions. Please feel free to call with any questions. [phone] (cell phone).     Riky Michaels DO  Board Certified Neurology

## 2023-02-02 NOTE — H&P
[address] [facility] - History & Physical      PCP: No primary care provider on file. Date of Admission: 2/2/2023    Date of Service: 2/2/2023    Chief Complaint:  Altered mental status    History Of Present Illness: The patient is a 34 y.o. male who presented to [facility] as a transfer from [facility] for evaluation of altered mental status. Pt has history of absence seizure disorder during childhood. Pt tells me that he is a  having developed confusion during work. He relates that he experienced 30 minutes of loss of time while sitting in a parked vehicle having had tongue numbness after associated with abrasion of right lateral side of tongue. He tells me that tongue numbness has resolved. He has midline upper back discomfort. He denies vision changes as well as lateralizing numbness/weakness. He experienced no incontinence. He tells me that he was diagnosed with petit mal seizure disorder at age 14 treated with Depakote that was discontinued at age 12. He has not had any seizure activity since childhood. He denies fevers as well as recent illness. Pt had CT of head that was reported to be normal at outlying facility and an elevated lactic acid of 3.0 given aspirin and normal saline fluid bolus. Case discussed with hospitalist and neurologist Dr. Beto Castro prior to transfer. Pt is admitted to hospitalist service in consultation with neurology for further evaluation. Past Medical History:    No past medical history on file. Past Surgical History:    No past surgical history on file. Home Medications:  Prior to Admission medications    Not on File       Allergies:    Patient has no allergy information on record. Social History:    The patient currently lives at home  Tobacco:   has no history on file for tobacco use. Alcohol:   has no history on file for alcohol use.   Illicit Drugs: denies    Family History:  No family history on file.    Review of Systems:   Review of Systems   Skin:  Positive for wound (right lateral tongue). Neurological:  Positive for numbness (lingual area-resolved). Psychiatric/Behavioral:  Positive for confusion (resolved). All other systems reviewed and are negative. 14 point review of systems is negative except as specifically addressed above. Physical Examination:  BP (!) 158/98   Pulse 93   Temp 98.4 °F (36.9 °C) (Temporal)   Resp 16   SpO2 97%   Physical Exam  Vitals reviewed. Constitutional:       General: He is not in acute distress. Appearance: Normal appearance. HENT:      Right Ear: External ear normal.      Left Ear: External ear normal.      Mouth/Throat:      Pharynx: Oropharynx is clear. Comments: Superficial abrasion right lateral tongue  Eyes:      Conjunctiva/sclera: Conjunctivae normal.   Cardiovascular:      Rate and Rhythm: Normal rate and regular rhythm. Pulmonary:      Effort: Pulmonary effort is normal.      Breath sounds: Normal breath sounds. Abdominal:      Tenderness: There is no abdominal tenderness. Musculoskeletal:      Cervical back: Neck supple. Comments: Mild tenderness to palpate upper thoracic paraspinal muscles  5/5 MS equal bilateral upper/lower extremities   Skin:     General: Skin is warm and dry. Neurological:      General: No focal deficit present. Mental Status: He is alert and oriented to person, place, and time. Assessment/Plan:  Principal Problem:    Altered mental status  Active Problems:    History of seizure disorder  Resolved Problems:    * No resolved hospital problems.  *     Principal Problem:    Altered mental status/ History of seizure disorder/Upper back pain   -consult neurology   -asa 81mg daily   -ativan prn  -lytes  -blood glucose  -seizure precautions  -neuro consult   -telemetry    -cbc   -cmp   -troponin   -tsh   -lipid panel   -uds   -magnesium    -NIHSS/neuro checks q4hrs   -pt/ot consult   -npo until bedside swallow assessment   -HgA1c   -Tspine imaging   -ekg   -toradol 30mg iv x1dose  Resolved Problems:    * No resolved hospital problems.  *  Signed:  YAQUELIN Cardona - CNP, 2/2/2023 2:25 PM

## 2023-02-03 VITALS
RESPIRATION RATE: 16 BRPM | HEART RATE: 85 BPM | WEIGHT: 278.3 LBS | OXYGEN SATURATION: 97 % | BODY MASS INDEX: 37.69 KG/M2 | DIASTOLIC BLOOD PRESSURE: 89 MMHG | HEIGHT: 72 IN | TEMPERATURE: 97.9 F | SYSTOLIC BLOOD PRESSURE: 135 MMHG

## 2023-02-03 LAB
AMPHETAMINE SCREEN, URINE: NEGATIVE
ANION GAP SERPL CALCULATED.3IONS-SCNC: 10 MMOL/L (ref 7–19)
BARBITURATE SCREEN URINE: NEGATIVE
BENZODIAZEPINE SCREEN, URINE: NEGATIVE
BUN BLDV-MCNC: 14 MG/DL (ref 6–20)
BUPRENORPHINE URINE: NEGATIVE
CALCIUM SERPL-MCNC: 9.2 MG/DL (ref 8.6–10)
CANNABINOID SCREEN URINE: NEGATIVE
CHLORIDE BLD-SCNC: 102 MMOL/L (ref 98–111)
CHOLESTEROL, TOTAL: 187 MG/DL (ref 160–199)
CO2: 27 MMOL/L (ref 22–29)
COCAINE METABOLITE SCREEN URINE: NEGATIVE
CREAT SERPL-MCNC: 1 MG/DL (ref 0.5–1.2)
GFR SERPL CREATININE-BSD FRML MDRD: >60 ML/MIN/{1.73_M2}
GLUCOSE BLD-MCNC: 112 MG/DL (ref 74–109)
HBA1C MFR BLD: 5.6 % (ref 4–6)
HCT VFR BLD CALC: 44.4 % (ref 42–52)
HDLC SERPL-MCNC: 50 MG/DL (ref 55–121)
HEMOGLOBIN: 14.7 G/DL (ref 14–18)
LDL CHOLESTEROL CALCULATED: 117 MG/DL
Lab: NORMAL
MCH RBC QN AUTO: 31.7 PG (ref 27–31)
MCHC RBC AUTO-ENTMCNC: 33.1 G/DL (ref 33–37)
MCV RBC AUTO: 95.9 FL (ref 80–94)
METHADONE SCREEN, URINE: NEGATIVE
METHAMPHETAMINE, URINE: NEGATIVE
OPIATE SCREEN URINE: NEGATIVE
OXYCODONE URINE: NEGATIVE
PDW BLD-RTO: 13.6 % (ref 11.5–14.5)
PHENCYCLIDINE SCREEN URINE: NEGATIVE
PLATELET # BLD: 196 K/UL (ref 130–400)
PMV BLD AUTO: 11.9 FL (ref 9.4–12.4)
POTASSIUM REFLEX MAGNESIUM: 4.6 MMOL/L (ref 3.5–5)
PROPOXYPHENE SCREEN: NEGATIVE
RBC # BLD: 4.63 M/UL (ref 4.7–6.1)
SODIUM BLD-SCNC: 139 MMOL/L (ref 136–145)
TRICYCLIC, URINE: NEGATIVE
TRIGL SERPL-MCNC: 100 MG/DL (ref 0–149)
WBC # BLD: 11.5 K/UL (ref 4.8–10.8)

## 2023-02-03 PROCEDURE — 94760 N-INVAS EAR/PLS OXIMETRY 1: CPT

## 2023-02-03 PROCEDURE — 97110 THERAPEUTIC EXERCISES: CPT

## 2023-02-03 PROCEDURE — 80061 LIPID PANEL: CPT

## 2023-02-03 PROCEDURE — 83036 HEMOGLOBIN GLYCOSYLATED A1C: CPT

## 2023-02-03 PROCEDURE — 99232 SBSQ HOSP IP/OBS MODERATE 35: CPT | Performed by: PSYCHIATRY & NEUROLOGY

## 2023-02-03 PROCEDURE — 80306 DRUG TEST PRSMV INSTRMNT: CPT

## 2023-02-03 PROCEDURE — 92610 EVALUATE SWALLOWING FUNCTION: CPT

## 2023-02-03 PROCEDURE — 80048 BASIC METABOLIC PNL TOTAL CA: CPT

## 2023-02-03 PROCEDURE — 92523 SPEECH SOUND LANG COMPREHEN: CPT

## 2023-02-03 PROCEDURE — 6370000000 HC RX 637 (ALT 250 FOR IP): Performed by: NURSE PRACTITIONER

## 2023-02-03 PROCEDURE — 85027 COMPLETE CBC AUTOMATED: CPT

## 2023-02-03 PROCEDURE — 97165 OT EVAL LOW COMPLEX 30 MIN: CPT

## 2023-02-03 PROCEDURE — 36415 COLL VENOUS BLD VENIPUNCTURE: CPT

## 2023-02-03 PROCEDURE — 95816 EEG AWAKE AND DROWSY: CPT

## 2023-02-03 PROCEDURE — 97161 PT EVAL LOW COMPLEX 20 MIN: CPT

## 2023-02-03 RX ORDER — LEVETIRACETAM 500 MG/1
500 TABLET ORAL 2 TIMES DAILY
Qty: 60 TABLET | Refills: 5 | Status: SHIPPED | OUTPATIENT
Start: 2023-02-03

## 2023-02-03 RX ADMIN — ASPIRIN 81 MG: 81 TABLET, COATED ORAL at 08:35

## 2023-02-03 NOTE — PROGRESS NOTES
Speech Language Pathology  Facility/Department: [facility] SURG SERVICES  Initial Speech/Language/Cognitive/Swallow Assessment    NAME: Jamison Brasher  : 1990   MRN: [id number]  ADMISSION DATE: 2023  ADMITTING DIAGNOSIS: has Altered mental status; History of seizure disorder; Upper back pain; and Confusion on their problem list.    Date of Eval: 2/3/2023   Evaluating Therapist: ONDINA Phan    Pain:  Patient reported tongue swelling and pain. RN notified. Assessment:  Completed assessment. Patient exhibited slow, decreased lingual movements during utterances. No significant deficits were noted in the areas of attention, auditory comprehension, verbal expression, orientation, memory, or reasoning/problem solving. Also evaluated patient's swallowing function. Patient exhibited sluggish laryngeal elevation for swallow airway protection. Even so, no outward S/S penetration/aspiration was observed with any puree consistency trial or thin H2O trial presented during assessment this date. Did not note swallow function with more solid consistencies as patient declined (secondary to reported mouth pain). At this time, would recommend full liquid diet with thin liquids. Self-feed. Thank you for this consult. Subjective:  General  Chart Reviewed: Yes  Patient assessed for rehabilitation services?: Yes  Family / Caregiver Present: No    Objective:  Oral Motor:   Labial ROM: Adequate during labial retraction trials and labial protrusion trials. Labial Strength: Adequate during labial compression trials. Labial Coordination: Adequate movements were noted. Lingual ROM: Decreased during lingual extension trials with no full point achieved; decreased during lingual elevation trials without use of accessory jaw movement; decreased movements noted bilaterally. Lingual Strength: Decreased   Lingual Coordination: Slowed movements were noted.       Auditory Comprehension  Comprehension:  (Patient demonstrated ability to answer yes/no questions of increased complexity and to follow complex 1 and simple 2 step commands at independent level and with adequate processing speed.)    Expression  Primary Mode of Expression:  (Confrontation naming of items in room was considered to be [facility]. Structured responsive speech was considered to be functional. Responses in natural conversation were considered to be timely and appropriate.)    Motor Speech:  (Patient exhibited slow, decreased lingual movements during verbalizations. SLP ranked functional intelligibility of speech for unfamiliar listeners at % in utterances with background noise present.)    Overall Orientation Status:  (Patient demonstrated ability to verbalize name, birthday, age, address, phone number, city, state, hospital, floor, month, PAWAN, year, and situation at independent level.)    Memory:  (Patient demonstrated appropriate immediate memory with sequences of unrelated numbers/words set up to 5 items without repetitions provided. Patient demonstrated appropriate short-term/working memory during assessment.)    Problem Solving:  (Patient verbalized PCP and pharmacy at independent level. Patient verbalized conditions in which he takes medications independently. Patient demonstrated ability to verbalize appropriate solutions to situations that could occur during activities of daily living at independent level.)    Additional Assessments:  Assessed patient's swallowing function. Oral transit of puree consistency trials, presented independently, primarily measured 1-2 seconds in length and min oral cavity residue was noted post swallows; residue cleared from the mouth with additional dry swallows. Oral transit of thin H2O trials, presented independently via straw, primarily measured 1 second in length. Laryngeal elevation during swallow initiation was considered to be sluggish for swallow airway protection.  Even so, no outward S/S penetration/aspiration was observed with any puree consistency trial or thin H2O trial presented during evaluation this date. Did not note swallow function with more solid consistencies as patient declined (secondary to reported mouth pain). At this time, would recommend full liquid diet with thin liquids. Self-feed.      Electronically signed by ONDINA Douglas on 2/3/2023 at 11:30 AM

## 2023-02-03 NOTE — PROGRESS NOTES
AVS given, all questions answered; pt verbalized understanding. Iv removed. All belongings sent with pt.  NAD noted, vss.

## 2023-02-03 NOTE — DISCHARGE SUMMARY
Discharge Summary      Date:2/3/2023        Patient Rian Larkin     YOB: 1990     Age:32 y.o. Admit Date:2/2/2023   Admission Condition:stable   Discharged Condition:stable  Discharge Date: 02/03/23       Discharge Diagnoses   Principal Problem:    Altered mental status  Active Problems:    History of seizure disorder    Upper back pain    Confusion  Resolved Problems:    * No resolved hospital problems. [facility] Stay   Narrative of Hospital Course:     79-year-old male with a history of absence seizure disorder during childhood, presented to the hospital as a transfer from outside facility with concerns of altered mental status consisting of difficulty with speech as well as biting the right side of his tongue with no incontinence. CT head at outside facility was reported to be normal, patient was noted to have elevated lactic acidosis of 3. 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 Was initiated on Keppra 500 mg twice daily, MRI of the brain was obtained without any evidence of acute infarct or abnormality. Was informed about no driving, heights, swimming, tub baths, or complaints of heavy missionary use in the next 6 months. No recurrent episode noted in-house. Discharged home with mother in stable condition follow-up outpatient with PCP for continuous management. Physical Examination:  General: Well-developed, no acute distress lying comfortably in bed. HEENT: Atraumatic normocephalic, range of motion normal, no JVD, no tracheal deviation noted. Cardiac: Normal S1-S2 no murmurs rub or gallop. Respiratory: clear To auscultation bilaterally, no rhonchi or rales, no wheezing  Abdomen: Soft, positive bowel sounds in all quadrants, no distention, nontender to palpation, no organomegaly noted, no rebound noted.     extremities: no tenderness, no edema, moves all extremities  Psych: Affect normal and good eye contact, behavioral normal.      Consultants:   IP CONSULT TO NEUROLOGY    Time Spent on Discharge:  35 minutes were spent in patient examination, evaluation, counseling as well as medication reconciliation, prescriptions for required medications, discharge plan and follow up. Surgeries/Procedures Performed:  NONE       Significant Diagnostic Studies:   Recent Labs:  CBC:   Lab Results   Component Value Date/Time    WBC 11.5 02/03/2023 02:01 AM    RBC 4.63 02/03/2023 02:01 AM    HGB 14.7 02/03/2023 02:01 AM    HCT 44.4 02/03/2023 02:01 AM    MCV 95.9 02/03/2023 02:01 AM    MCH 31.7 02/03/2023 02:01 AM    MCHC 33.1 02/03/2023 02:01 AM    RDW 13.6 02/03/2023 02:01 AM     02/03/2023 02:01 AM     BMP:    Lab Results   Component Value Date/Time    GLUCOSE 112 02/03/2023 02:01 AM     02/03/2023 02:01 AM    K 4.6 02/03/2023 02:01 AM     02/03/2023 02:01 AM    CO2 27 02/03/2023 02:01 AM    ANIONGAP 10 02/03/2023 02:01 AM    BUN 14 02/03/2023 02:01 AM    CREATININE 1.0 02/03/2023 02:01 AM    CALCIUM 9.2 02/03/2023 02:01 AM    LABGLOM >60 02/03/2023 02:01 AM       Radiology Last 7 Days:  XR THORACIC SPINE (2 VIEWS)    Result Date: 2/3/2023  NO EVIDENCE OF FRACTURE OR MALALIGNMENT. MRI BRAIN W WO CONTRAST    Result Date: 2/2/2023  1. No focal cortical abnormality. 2. No abnormal neuroparenchymal/meningeal enhancement. Recommendation: Follow up as clinically indicated. Dictated and Electronically Signed by Kayla Suarez MD at 02-Feb-2023 07:40:29 PM EST               Discharge Plan   Disposition: Home    Provider Follow-Up:   No follow-up provider specified.          Patient Instructions   Diet: regular diet    Activity: activity as tolerated      Discharge Medications         Medication List        START taking these medications      levETIRAcetam 500 MG tablet  Commonly known as: KEPPRA  Take 1 tablet by mouth 2 times daily               Where to Get Your Medications These [id number] - [address] [facility], [address] [facility]  [address]  [address] [facility], [address] [place] [address]      Phone: [phone]   levETIRAcetam 500 MG tablet     Pharmacy Instructions:    [facility] AND [address], [place] [id number] [phone]           Electronically signed by Denice Schulz MD on 2/3/23 at 10:57 AM CST

## 2023-02-03 NOTE — PLAN OF CARE
Problem: Discharge Planning  Goal: Discharge to home or other facility with appropriate resources  2/3/2023 1510 by Samantha Polo RN  Outcome: Adequate for Discharge  2/3/2023 1127 by Samantha Polo RN  Outcome: Adequate for Discharge  Flowsheets (Taken 2/3/2023 7002)  Discharge to home or other facility with appropriate resources: Identify barriers to discharge with patient and caregiver  2/3/2023 0347 by Radha Lynn RN  Outcome: Progressing  Flowsheets  Taken 2/3/2023 0340 by Radha Lynn RN  Discharge to home or other facility with appropriate resources: Identify barriers to discharge with patient and caregiver  Taken 2/2/2023 1437 by Roshan Hernandez RN  Discharge to home or other facility with appropriate resources:   Identify barriers to discharge with patient and caregiver   Identify discharge learning needs (meds, wound care, etc)   Refer to discharge planning if patient needs post-hospital services based on physician order or complex needs related to functional status, cognitive ability or social support system   Arrange for needed discharge resources and transportation as appropriate   Arrange for interpreters to assist at discharge as needed     Problem: Safety - Adult  Goal: Free from fall injury  2/3/2023 1510 by Samantha Polo RN  Outcome: Adequate for Discharge  2/3/2023 1127 by Samantha Polo RN  Outcome: Adequate for Discharge  2/3/2023 0347 by Radha Lynn RN  Outcome: Progressing  Flowsheets (Taken 2/3/2023 0345)  Free From Fall Injury: Instruct family/caregiver on patient safety     Problem: Pain  Goal: Verbalizes/displays adequate comfort level or baseline comfort level  2/3/2023 1510 by Samantha Polo RN  Outcome: Adequate for Discharge  2/3/2023 1127 by Samantha Polo RN  Outcome: Adequate for Discharge  2/3/2023 0347 by Radha Lynn RN  Outcome: Progressing

## 2023-02-03 NOTE — PROGRESS NOTES
Physical Therapy  Facility/Department: [facility] SURG SERVICES  Physical Therapy Initial Assessment    Name: Peña Berger  : 1990  MRN: [id number]  Date of Service: 2/3/2023    Discharge Recommendations:  No therapy recommended at discharge, Home with assist PRN          Patient Diagnosis(es): There were no encounter diagnoses. Past Medical History:  has a past medical history of Absence seizure disorder ([address] and Altered mental status. Past Surgical History:  has no past surgical history on file. Assessment   Assessment: Pt. tolerated mobility well. Pt. safe to mobilize in room indep. No PT needed while in acute care. Pt. seems to be at functional baseline. Treatment Diagnosis: indep mobility and gait  Therapy Prognosis: Excellent  Decision Making: Low Complexity  Barriers to Learning: none  No Skilled PT: At baseline function  Requires PT Follow-Up: No  Activity Tolerance  Activity Tolerance: Patient tolerated treatment well     Plan   Physcial Therapy Plan  General Plan: Discharge with evaluation only  Safety Devices  Type of Devices:  (up ad rosa)     Restrictions  Restrictions/Precautions  Restrictions/Precautions: Seizure     Subjective   Pain: none  General  Chart Reviewed: Yes  Patient assessed for rehabilitation services?: Yes  Response To Previous Treatment: Not applicable  Family / Caregiver Present: No  Referring Practitioner: YAQUELIN Vidal CNP  Referral Date : 23  Diagnosis: AMS, hx of seizure d/o  Follows Commands: Within Functional Limits  General Comment  Comments: RNSanty PT. Subjective  Subjective: Pt. willing to work with therapy. States everything is fine and he wants to get his discharge papers.          Social/Functional History  Social/Functional History  Lives With: Alone  Type of Home: House  ADL Assistance: Independent  Homemaking Assistance: Independent  Ambulation Assistance: Independent  Transfer Assistance: Independent  Active : Yes  Type of Occupation: semi , farmer  Leisure & Hobbies: has dogs  Vision/Hearing  Vision  Vision: Within Functional Limits  Hearing  Hearing: Within functional limits    Cognition   Orientation  Overall Orientation Status: Within Functional Limits  Cognition  Overall Cognitive Status: WFL     Objective   Heart Rate: 85  Heart Rate Source: Monitor  BP: 135/89  BP Location: Left upper arm  MAP (Calculated): 104  Resp: 16  SpO2: 97 %  O2 Device: None (Room air)     Observation/Palpation  Posture: Good  Observation: pt up in chair        AROM RLE (degrees)  RLE AROM: WNL  AROM LLE (degrees)  LLE AROM : WNL  Strength RLE  Strength RLE: WNL  Comment: 5/5  Strength LLE  Strength LLE: WNL  Comment: 5/5           Bed mobility  Supine to Sit: Unable to assess  Sit to Supine: Unable to assess  Bed Mobility Comments: pt already up to recliner  Transfers  Sit to Stand: Independent  Stand to Sit: Independent  Comment: pt donned shoes  Ambulation  Surface: Level tile  Device: No Device  Assistance: Independent  Quality of Gait: steady, fast pace  Distance: 250'     Balance  Posture: Good  Sitting - Static: Good;+  Sitting - Dynamic: Good;+  Standing - Static: Good;+  Standing - Dynamic: Good;+           OutComes Score                                                  AM-PAC Score             Tinneti Score       Goals  Patient Goals   Patient Goals : go home       Education  Patient Education  Education Given To: Patient  Education Provided: Role of Therapy  Education Provided Comments: use of call light for needs  Education Method: Verbal  Barriers to Learning: None  Education Outcome: Verbalized understanding;Demonstrated understanding      Therapy Time   Individual Concurrent Group Co-treatment   Time In           Time Out           Minutes                   Emory Tate PT   Electronically signed by Emory Tate PT on 2/3/2023 at 9:50 AM

## 2023-02-03 NOTE — PROGRESS NOTES
Occupational Therapy  Facility/Department: [address] Initial Assessment    Name: Gilbert Sultana  : 1990  MRN: [id number]  Date of Service: 2/3/2023    Discharge Recommendations:  No follow up OT recommended           Patient Diagnosis(es): There were no encounter diagnoses. Past Medical History:  has a past medical history of Absence seizure disorder ([address] and Altered mental status. Past Surgical History:  has no past surgical history on file. Assessment   Assessment: OT eval only. Pt Ind in ADL and functional mobility. No Skilled OT: Independent with functional mobility; Independent with ADL's  REQUIRES OT FOLLOW-UP: No  Activity Tolerance  Activity Tolerance: Patient Tolerated treatment well        Plan   Occupational Therapy Plan  Additional Comments: eval only     Restrictions  Restrictions/Precautions  Restrictions/Precautions: Seizure    Subjective         Social/Functional History  Social/Functional History  Lives With: Alone  ADL Assistance: Independent  Homemaking Assistance: Independent  Ambulation Assistance: Independent  Transfer Assistance: Independent  Active : Yes  Type of Occupation: semi        Objective   Heart Rate: 85  Heart Rate Source: Monitor  BP: 135/89  BP Location: Left upper arm  MAP (Calculated): 104  Resp: 16  SpO2: 97 %  O2 Device: None (Room air)             Safety Devices  Type of Devices:  (pt up ad rosa)     Toilet Transfers  Toilet - Technique: Ambulating  Toilet Transfer: Independent (per clinical observation of prerequisite skills)     ADL  Feeding: Independent  Grooming: Independent  UE Bathing: Independent  LE Bathing: Independent  UE Dressing: Independent  LE Dressing: Independent  Toileting: Independent           Transfers  Sit to stand: Independent  Stand to sit:  Independent  Vision  Vision: Within Functional Limits  Hearing  Hearing: Within functional limits  Cognition  Overall Cognitive Status: WFL  Orientation  Overall Orientation Status: Within Functional Limits                     LUE AROM (degrees)  LUE AROM : WFL  RUE AROM (degrees)  RUE AROM : WFL                       Goals  Eval only          Rakesh Brown OT  Electronically signed by Rakesh Brown OT on 2/3/2023 at 9:15 AM

## 2023-02-03 NOTE — CARE COORDINATION
Case Management Assessment  Initial Evaluation    Date/Time of Evaluation: 2/3/2023 1:24 PM  Assessment Completed by: Jimenez Mccarthy RN, BSN    If patient is discharged prior to next notation, then this note serves as note for discharge by case management. Patient Name: Venita Cruz                   YOB: 1990  Diagnosis: Altered mental status [R41.82]                   Date / Time: 2/2/2023 12:52 PM    Patient Admission Status: Inpatient   Readmission Risk (Low < 19, Mod (19-27), High > 27): Readmission Risk Score: 3.5    Current PCP: No primary care provider on file. PCP verified by CM? No (no PCP)    Chart Reviewed: Yes      History Provided by: Patient  Patient Orientation: Alert and Oriented    Patient Cognition: Alert    Hospitalization in the last 30 days (Readmission):  No    If yes, Readmission Assessment in CM Navigator will be completed. Advance Directives:      Code Status: Full Code   Patient's Primary Decision Maker is: Patient Declined (Legal Next of Kin Remains as Decision Maker)      Discharge Planning:    Patient lives with: Alone Type of Home: House  Primary Care Giver: Self  Patient Support Systems include: Parent, Family Members, Friends/Neighbors   Current Financial resources: None  Current community resources:    Current services prior to admission: None            Current DME:              Type of Home Care services:  None    ADLS  Prior functional level: Independent in ADLs/IADLs  Current functional level: Independent in ADLs/IADLs    PT AM-PAC:   /24  OT AM-PAC:   /24    Family can provide assistance at DC: Yes  Would you like Case Management to discuss the discharge plan with any other family members/significant others, and if so, who?     Plans to Return to Present Housing: Yes  Other Identified Issues/Barriers to RETURNING to current housing:   none  Potential Assistance needed at discharge: N/A            Potential DME:    Patient expects to discharge to: House  Plan for transportation at discharge:      Financial    Payor: /     Does insurance require precert for SNF: No    Potential assistance Purchasing Medications: Yes  Meds-to-Beds request:        [address], [address]  [address] [phone]  [facility]  Phone: [phone] Fax: [phone]      Notes:    Factors facilitating achievement of predicted outcomes: Family support, Motivated, Cooperative, and Pleasant    Barriers to discharge: No insurance coverage    Additional Case Management Notes: Patient will be discharged to home. No needs identified.     The Plan for Transition of Care is related to the following treatment goals of Altered mental status [R41.82]    Electronically signed by Lindsay Puentes RN, BSN on 2/3/2023 at 1:25 PM

## 2023-02-03 NOTE — PLAN OF CARE
Problem: Discharge Planning  Goal: Discharge to home or other facility with appropriate resources  2/3/2023 1127 by Rajni Pena RN  Outcome: Adequate for Discharge  Flowsheets (Taken 2/3/2023 0512)  Discharge to home or other facility with appropriate resources: Identify barriers to discharge with patient and caregiver  2/3/2023 0347 by Herbert Robles RN  Outcome: Progressing  Flowsheets  Taken 2/3/2023 0340 by Herbert Robles RN  Discharge to home or other facility with appropriate resources: Identify barriers to discharge with patient and caregiver  Taken 2/2/2023 1437 by Tatiana Joseph RN  Discharge to home or other facility with appropriate resources:   Identify barriers to discharge with patient and caregiver   Identify discharge learning needs (meds, wound care, etc)   Refer to discharge planning if patient needs post-hospital services based on physician order or complex needs related to functional status, cognitive ability or social support system   Arrange for needed discharge resources and transportation as appropriate   Arrange for interpreters to assist at discharge as needed     Problem: Safety - Adult  Goal: Free from fall injury  2/3/2023 1127 by Rajni Pena RN  Outcome: Adequate for Discharge  2/3/2023 0347 by Herbert Robles RN  Outcome: Progressing  Flowsheets (Taken 2/3/2023 0345)  Free From Fall Injury: Instruct family/caregiver on patient safety     Problem: Pain  Goal: Verbalizes/displays adequate comfort level or baseline comfort level  2/3/2023 1127 by Rajni Pena RN  Outcome: Adequate for Discharge  2/3/2023 0347 by Herbert Robles RN  Outcome: Progressing

## 2023-02-03 NOTE — PROGRESS NOTES
[facility] Neurology Progress Note      Patient:   Brianna Bowie  MR#:    [id number]   Room:    3735/989-83   YOB: 1990  Date of Progress Note: 2/3/2023  Time of Note                           11:48 AM  Consulting Physician:  Kavitha Elias DO  Attending Physician:  Vaibhav Hernandez MD      INTERVAL HISTORY:  No further seizures, back to baseline. REVIEW OF SYSTEMS:  Constitutional: No fevers No chills  Neck:No stiffness  Respiratory: No shortness of breath  Cardiovascular: No chest pain No palpitations  Gastrointestinal: No abdominal pain    Genitourinary: No Dysuria  Neurological: No headache, no confusion    PHYSICAL EXAM:    Constitutional -   /89   Pulse 85   Temp 97.9 °F (36.6 °C) (Temporal)   Resp 16   Ht 6' (1.829 m)   Wt 278 lb 4.8 oz (126.2 kg)   SpO2 97%   BMI 37.74 kg/m²   General appearance: No acute distress   EYES -   Conjunctiva normal  Pupillary exam as below, see CN exam in the neurologic exam  ENT-    No scars, masses, or lesions over external nose or ears  Oropharynx without erythema, palate midline  Cardiovascular -   No clubbing, cyanosis, or edema   Pulmonary-   Good expansion, normal effort without use of accessory muscles  Musculoskeletal -   No significant wasting of muscles noted  Gait as below, see gait exam in the neurologic exam  Muscle strength, tone, stability as below see the motor exam in the neurologic exam.   No bony deformities  Skin -   Warm, dry, and intact to inspection and palpation. No rash, erythema, or pallor  Psychiatric -   Mood, affect, and behavior appear normal    Memory as below see mental status examination in the neurologic exam        NEUROLOGICAL EXAM     Mental status    [x] Awake, alert, oriented   [x] Affect attention and concentration appear appropriate  [x] Recent and remote memory appears unremarkable  [x] Speech normal without dysarthria or aphasia, comprehension and repetition intact.    COMMENTS:   Cranial Nerves [x] No VF deficit to confrontation  [x] PERRLA, EOMI, no nystagmus, conjugate eye movements, no ptosis  [x] Face symmetric  [x] Facial sensation intact  [x] Tongue midline no atrophy or fasciculations present  [x] Palate midline, hearing to finger rub normal  [x] Shoulder shrug and SCM testing normal  COMMENTS:   Motor   [x] 5/5 strength x 4 extremities  [x] Normal bulk and tone  [x] No tremor present  [x] No rigidity or bradykinesia noted  COMMENTS:   Sensory  [x] Sensation intact to light touch, pin prick, vibration, and proprioception BLE  [] Sensation intact to light touch, pin prick, vibration, and proprioception BUE  COMMENTS:   Coordination [x] FTN normal bilaterally   [] HTS normal bilaterally  [] SILVIA normal.   COMMENTS:   Reflexes  [x] Symmetric and non-pathological  [x] Toes downgoing bilaterally  [x] No clonus present  COMMENTS:   Gait                  [] Normal steady gait    [] Ataxic    [] Spastic     [] Magnetic     [] Shuffling  [x] Not assessed  COMMENTS:      LABS/IMAGING:    XR THORACIC SPINE (2 VIEWS)    Result Date: 2/3/2023  THORACIC SPINE, TWO VIEWS: CLINICAL history: Upper back pain Findings : The disc spaces are normally maintained. The vertebral bodies are intact and normally aligned. The articular pedicles and neural spines are normally seen. There is no evidence of fracture or malalignment. The prevertebral soft tissue structures appear normal.    NO EVIDENCE OF FRACTURE OR MALALIGNMENT. MRI BRAIN W WO CONTRAST    Result Date: 2/2/2023  NO PRIOR REPORT AVAILABLE Exam: MRI OF THE BRAIN WITHOUT AND WITHINTRAVENOUS CONTRAST Clinical data:Possible seizure. History of seizures as a child. Technique: Multiplanar multi-sequence MRI of the brain without and with intravenous gadolinium. Diffusion-weighted imaging is submitted. Contrast: MultiHhance. Amount: 100 mL. Prior studies: No prior studies submitted. Findings: No abnormal parenchymal signal is present.  No acute intracranial abnormality, specifically, cortical infarction, hemorrhage, mass or mass effect is seen. The ventricular system is normal in size, shape, and contour. No abnormal extra-axial fluid collections are present. The marrow signal within the skull base and calvarium is intact. Scattered mild paranasal sinus disease, most pronounced in the ethmoids, and maxillary sinuses. Larger intracranial flow voids are intact. No abnormal leptomeningeal, parenchymal or ependymal enhancement. DWI/ADC: No focus of restriction of diffusion. 1. No focal cortical abnormality. 2. No abnormal neuroparenchymal/meningeal enhancement. Recommendation: Follow up as clinically indicated. Dictated and Electronically Signed by Lila Avila MD at 02-Feb-2023 07:40:29 PM EST               Lab Results   Component Value Date    WBC 11.5 (H) 02/03/2023    HGB 14.7 02/03/2023    HCT 44.4 02/03/2023    MCV 95.9 (H) 02/03/2023     02/03/2023     Lab Results   Component Value Date     02/03/2023    K 4.6 02/03/2023     02/03/2023    CO2 27 02/03/2023    BUN 14 02/03/2023    CREATININE 1.0 02/03/2023    GLUCOSE 112 (H) 02/03/2023    CALCIUM 9.2 02/03/2023    PROT 6.9 02/02/2023    LABALBU 4.0 02/02/2023    BILITOT <0.2 02/02/2023    ALKPHOS 68 02/02/2023    AST 22 02/02/2023    ALT 27 02/02/2023    LABGLOM >60 02/03/2023       RECORD REVIEW:   Previous medical records, medications were reviewed at today's visit. Nursing/physician notes, imaging, labs and vitals reviewed. PT,OT and/or speech notes reviewed       ASSESSMENT:  34 y.o. admitted with a confusional episode with tongue biting, EEG is abnormal, suspect seizure. Remote history of seizures during childhood, likely has SANA given EEG appearance. No further events. MRI negative. Exam stable. PLAN:   Continue Keppra 500 mg bid    Epilepsy precautions and seizure first aid discussed. No driving, heights, swimming, tub baths, open flames, or heavy machinery.      Ativan prn, seizure precautions. Josué Fontanez for discharge today, follow up with me in 1 month. Please feel free to call with any questions. [phone] (cell phone).       Sigrid Santos DO  Board Certified Neurology

## 2023-02-05 LAB
EKG P AXIS: 61 DEGREES
EKG P-R INTERVAL: 146 MS
EKG Q-T INTERVAL: 364 MS
EKG QRS DURATION: 106 MS
EKG QTC CALCULATION (BAZETT): 406 MS
EKG T AXIS: 54 DEGREES

## 2023-02-06 ENCOUNTER — TELEPHONE (OUTPATIENT)
Dept: NEUROLOGY | Age: 33
End: 2023-02-06

## 2023-02-06 NOTE — TELEPHONE ENCOUNTER
Patient called back I scheduled him a hospital follow up also he asked about work restrictions. I explained to him that he couldn't drive or operated heavy machinery open flames, ect (seizure precautions) patient understood. 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

## 2023-02-06 NOTE — TELEPHONE ENCOUNTER
Tried to reach patient his vm is full, will have to try to call him again. Patient will need an appt with Dr Jolene Cardona in 1 month, per his ED d/c note.

## 2023-02-09 NOTE — TELEPHONE ENCOUNTER
Called patient to let him know that per Dr Deandre Cuba he stated that I can type up letter for him with work restrictions patient understood.  Will get it ready for Dr Deandre Cuba to sign and patient to

## 2023-02-22 ENCOUNTER — OFFICE VISIT (OUTPATIENT)
Dept: NEUROLOGY | Age: 33
End: 2023-02-22

## 2023-02-22 VITALS
SYSTOLIC BLOOD PRESSURE: 128 MMHG | HEIGHT: 72 IN | BODY MASS INDEX: 37.65 KG/M2 | OXYGEN SATURATION: 97 % | DIASTOLIC BLOOD PRESSURE: 74 MMHG | WEIGHT: 278 LBS | HEART RATE: 87 BPM

## 2023-02-22 DIAGNOSIS — G40.909 SEIZURE DISORDER (HCC): Primary | ICD-10-CM

## 2023-02-22 PROCEDURE — 99213 OFFICE O/P EST LOW 20 MIN: CPT | Performed by: PSYCHIATRY & NEUROLOGY

## 2023-02-22 RX ORDER — ATORVASTATIN CALCIUM 10 MG/1
10 TABLET, FILM COATED ORAL DAILY
COMMUNITY
Start: 2022-05-03

## 2023-02-22 NOTE — PROGRESS NOTES
[id number] [facility] Neurology Office Note      Patient:   Ricardo Poe  MR#:    [id number]  Account Number:                         YOB: 1990  Date of Evaluation:  2/22/2023  Time of Note:                          2:14 PM  Consulting Physician:  Cezar Hollis, DO    FOLLOW UP    Chief Complaint   Patient presents with    Follow-Up from Hospital    Seizures     No seizures to report     Results     Results from recent tests        [facility] [address] is a 34y.o. year old male here for seizure follow up. Admitted recently with new onset seizure. MRI negative. EEG was abnormal suggestive of PGE. On Keppra. No seizures. No side effects. Past Medical History:   Diagnosis Date    Absence seizure disorder ([address]     Altered mental status        History reviewed. No pertinent surgical history. History reviewed. No pertinent family history.     Social History     Socioeconomic History    Marital status: Unknown     Spouse name: Not on file    Number of children: Not on file    Years of education: Not on file    Highest education level: Not on file   Occupational History    Not on file   Tobacco Use    Smoking status: Every Day     Types: Cigarettes    Smokeless tobacco: Never   Vaping Use    Vaping Use: Never used   Substance and Sexual Activity    Alcohol use: Not Currently    Drug use: Never    Sexual activity: Yes   Other Topics Concern    Not on file   Social History Narrative    Not on file     Social Determinants of Health     Financial Resource Strain: Not on file   Food Insecurity: Not on file   Transportation Needs: Not on file   Physical Activity: Not on file   Stress: Not on file   Social Connections: Not on file   Intimate Partner Violence: Not on file   Housing Stability: Not on file       Current Outpatient Medications   Medication Sig Dispense Refill    atorvastatin (LIPITOR) 10 MG tablet Take 10 mg by mouth daily      levETIRAcetam (KEPPRA) 500 MG tablet Take 1 tablet by mouth 2 times daily 60 tablet 5     No current facility-administered medications for this visit. ALLERGIES   No Known Allergies      REVIEW OF SYSTEMS    Constitutional: []Fever []Sweat []Chills [] Recent Injury [x] Denies all unless marked  HEENT:[]Headache  [] Head Injury/Hearing Loss  [] Sore Throat  [] Ear Ache/Dizziness  [x] Denies all unless marked  Spine:  [] Neck pain  [] Back pain  [] Sciaticia  [x] Denies all unless marked  Cardiovascular:[]Heart Disease []Chest Pain [] Palpitations  [x] Denies all unless marked  Pulmonary: []Shortness of Breath []Cough   [x] Denies all unless marke  Gastrointestinal: []Nausea  []Vomiting  []Abdominal Pain  []Constipation  []Diarrhea  []Dark Bloody Stools  [x] Denies all unless marked  Psychiatric/Behavioral:[] Depression [] Anxiety [x] Denies all unless marked  Genitourinary:   [] Frequency  [] Urgency  [] Incontinence [] Pain with Urination  [x] Denies all unless marked  Extremities: []Pain  []Swelling  [x] Denies all unless marked  Musculoskeletal: [] Muscle Pain  [] Joint Pain  [] Arthritis [] Muscle Cramps [] Muscle Twitches  [x] Denies all unless marked  Sleep: [] Insomnia [] Snoring [] Restless Legs [] Sleep Apnea  [] Daytime Sleepiness  [x] Denies all unless marked  Skin:[] Rash [] Skin Discoloration [x] Denies all unless marked   Neurological: []Visual Disturbance/Memory Loss [] Loss of Balance [] Slurred Speech/Weakness [] Seizures  [] Vertigo/Dizziness [x] Denies all unless marked         I have reviewed the above ROS with the patient and agree with the ROS as documented above.          PHYSICAL EXAM    Constitutional -   /74   Pulse 87   Ht 6' (1.829 m)   Wt 278 lb (126.1 kg)   SpO2 97%   BMI 37.70 kg/m²   General appearance: No acute distress   EYES -   Conjunctiva normal  Pupillary exam as below, see CN exam in the neurologic exam  ENT-    No scars, masses, or lesions over external nose or ears  Hearing normal bilaterally to finger rub  Cardiovascular -   No clubbing, cyanosis, or edema   Pulmonary-   Good expansion, normal effort without use of accessory muscles  Musculoskeletal -   No significant wasting of muscles noted  Gait as below, see gait exam in the neurologic exam  Muscle strength, tone, stability as below. No bony deformities  Skin -   Warm, dry, and intact to inspection and palpation. No rash, erythema, or pallor  Psychiatric -   Mood, affect, and behavior appear normal    Memory as below see mental status examination in the neurologic exam    NEUROLOGICAL EXAM    Mental status   [x]Awake, alert, oriented   [x]Affect attention and concentration appear appropriate  [x]Recent and remote memory appears unremarkable  [x]Speech normal without dysarthria or aphasia, comprehension and repetition intact. COMMENTS:    Cranial Nerves [x]No VF deficit to confrontation  [x]PERRLA, EOMI, no nystagmus, conjugate eye movements, no ptosis  [x]Face symmetric  [x]Facial sensation intact  [x]Tongue midline no atrophy or fasciculations present  [x]Palate midline, hearing to finger rub normal bilaterally  [x]Shoulder shrug and SCM testing normal bilaterally  COMMENTS:   Motor   [x]5/5 strength x 4 extremities  [x]Normal bulk and tone  [x]No tremor present  [x]No rigidity or bradykinesia noted  COMMENTS:   Sensory  [x]Sensation intact to light touch, pin prick, vibration, and proprioception BLE  []Sensation intact to light touch, pin prick, vibration, and proprioception BUE  COMMENTS:   Coordination [x]FTN normal bilaterally   []HTS normal bilaterally  []SILVIA normal bilaterally.    COMMENTS:   Reflexes  [x]Symmetric and non-pathological  [x]Toes down going bilaterally  [x]No clonus present  COMMENTS:   Gait                  [x]Normal steady gait    []Ataxic    []Spastic     []Magnetic     []Shuffling  COMMENTS:       LABS RECORD AND IMAGING REVIEW (As below and per HPI)      Lab Results   Component Value Date    WBC 11.5 (H) 02/03/2023    HGB 14.7 02/03/2023    HCT 44.4 02/03/2023    MCV 95.9 (H) 02/03/2023     02/03/2023     Lab Results   Component Value Date     02/03/2023    K 4.6 02/03/2023     02/03/2023    CO2 27 02/03/2023    BUN 14 02/03/2023    CREATININE 1.0 02/03/2023    GLUCOSE 112 (H) 02/03/2023    CALCIUM 9.2 02/03/2023    PROT 6.9 02/02/2023    LABALBU 4.0 02/02/2023    BILITOT <0.2 02/02/2023    ALKPHOS 68 02/02/2023    AST 22 02/02/2023    ALT 27 02/02/2023    LABGLOM >60 02/03/2023       MRI BRAIN W WO CONTRAST    Result Date: 2/2/2023  NO PRIOR REPORT AVAILABLE Exam: MRI OF THE BRAIN WITHOUT AND WITHINTRAVENOUS CONTRAST Clinical data:Possible seizure. History of seizures as a child. Technique: Multiplanar multi-sequence MRI of the brain without and with intravenous gadolinium. Diffusion-weighted imaging is submitted. Contrast: MultiHhance. Amount: 100 mL. Prior studies: No prior studies submitted. Findings: No abnormal parenchymal signal is present. No acute intracranial abnormality, specifically, cortical infarction, hemorrhage, mass or mass effect is seen. The ventricular system is normal in size, shape, and contour. No abnormal extra-axial fluid collections are present. The marrow signal within the skull base and calvarium is intact. Scattered mild paranasal sinus disease, most pronounced in the ethmoids, and maxillary sinuses. Larger intracranial flow voids are intact. No abnormal leptomeningeal, parenchymal or ependymal enhancement. DWI/ADC: No focus of restriction of diffusion. 1. No focal cortical abnormality. 2. No abnormal neuroparenchymal/meningeal enhancement. Recommendation: Follow up as clinically indicated. Dictated and Electronically Signed by Gianna Snyder MD at 02-Feb-2023 07:40:29 PM EST               ASSESSMENT:    Venita Cruz is a 34y.o. year old male here for seizure follow up. Doing well, no seizures, on Keppra. EEG is abnormal suggestive of PGE, likely SANA. MRI negative.      PLAN: Continue Keppra 500 mg bid    Epilepsy precautions and seizure first aid discussed. Driving per [employer], no heights, swimming, tub baths, open flames, or heavy machinery.         Calista Franks, DO  Board Certified Neurology

## 2023-05-05 ENCOUNTER — TELEPHONE (OUTPATIENT)
Dept: NEUROLOGY | Age: 33
End: 2023-05-05

## 2023-05-08 NOTE — TELEPHONE ENCOUNTER
Called patient back to let him know that I was faxing letter and verified fax number  [phone]  [facility] .

## 2023-05-08 NOTE — TELEPHONE ENCOUNTER
Patient returned call to the office, I told him I couldn't leave a vm since his vm was full. Patient stated that he would empty it I asked him if has had any episodes since last seen. Patient stated that he has not had any episodes at all.

## 2023-06-26 ENCOUNTER — TELEPHONE (OUTPATIENT)
Dept: NEUROLOGY | Age: 33
End: 2023-06-26

## 2023-08-10 ENCOUNTER — TELEPHONE (OUTPATIENT)
Dept: NEUROLOGY | Age: 33
End: 2023-08-10

## 2023-08-10 RX ORDER — LEVETIRACETAM 500 MG/1
500 TABLET ORAL 2 TIMES DAILY
Qty: 60 TABLET | Refills: 5 | Status: SHIPPED | OUTPATIENT
Start: 2023-08-10

## 2023-08-10 NOTE — TELEPHONE ENCOUNTER
Requested Prescriptions     Pending Prescriptions Disp Refills    levETIRAcetam (KEPPRA) 500 MG tablet 60 tablet 5     Sig: Take 1 tablet by mouth 2 times daily       Last Office Visit: Visit date not found  Next Office Visit: Visit date not found  Last Medication Refill: 2/3/2023 with 5 RF

## 2023-08-10 NOTE — TELEPHONE ENCOUNTER
Pt called about refills, The pharmacy called him and they need someone to call over to verify. Thanks !

## 2023-08-22 ENCOUNTER — OFFICE VISIT (OUTPATIENT)
Dept: NEUROLOGY | Age: 33
End: 2023-08-22

## 2023-08-22 VITALS
SYSTOLIC BLOOD PRESSURE: 126 MMHG | HEIGHT: 72 IN | BODY MASS INDEX: 37.65 KG/M2 | HEART RATE: 78 BPM | WEIGHT: 278 LBS | OXYGEN SATURATION: 99 % | DIASTOLIC BLOOD PRESSURE: 68 MMHG

## 2023-08-22 DIAGNOSIS — G40.909 SEIZURE DISORDER (HCC): Primary | ICD-10-CM

## 2023-08-22 PROCEDURE — 99213 OFFICE O/P EST LOW 20 MIN: CPT | Performed by: PSYCHIATRY & NEUROLOGY

## 2024-01-30 RX ORDER — LEVETIRACETAM 500 MG/1
500 TABLET ORAL 2 TIMES DAILY
Qty: 60 TABLET | Refills: 5 | Status: SHIPPED | OUTPATIENT
Start: 2024-01-30

## 2024-01-30 NOTE — TELEPHONE ENCOUNTER
Requested Prescriptions     Pending Prescriptions Disp Refills    levETIRAcetam (KEPPRA) 500 MG tablet [Pharmacy Med Name: LEVETIRACETAM 500MG TABLETS] 60 tablet 5     Sig: TAKE 1 TABLET BY MOUTH TWICE DAILY       Last Office Visit: Visit date not found  Next Office Visit: Visit date not found  Last Medication Refill:8/10/2023 with 5 RF

## 2024-03-13 ENCOUNTER — OFFICE VISIT (OUTPATIENT)
Dept: NEUROLOGY | Age: 34
End: 2024-03-13
Payer: COMMERCIAL

## 2024-03-13 VITALS
BODY MASS INDEX: 37.65 KG/M2 | HEART RATE: 87 BPM | OXYGEN SATURATION: 98 % | WEIGHT: 278 LBS | HEIGHT: 72 IN | DIASTOLIC BLOOD PRESSURE: 82 MMHG | SYSTOLIC BLOOD PRESSURE: 138 MMHG

## 2024-03-13 DIAGNOSIS — G40.909 SEIZURE DISORDER (HCC): Primary | ICD-10-CM

## 2024-03-13 PROCEDURE — 99213 OFFICE O/P EST LOW 20 MIN: CPT | Performed by: PSYCHIATRY & NEUROLOGY

## 2024-03-13 NOTE — PROGRESS NOTES
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 year old male here for seizure follow up.  Admitted previously with new onset seizure.  MRI negative.  EEG was abnormal suggestive of PGE.  On Keppra. No seizures since last seen. Remains improved. No side effects.       Past Medical History:   Diagnosis Date    Absence seizure disorder (HCC)     Altered mental status        No past surgical history on file.    No family history on file.    Social History     Socioeconomic History    Marital status: Unknown     Spouse name: Not on file    Number of children: Not on file    Years of education: Not on file    Highest education level: Not on file   Occupational History    Not on file   Tobacco Use    Smoking status: Every Day     Types: Cigarettes     Passive exposure: Current    Smokeless tobacco: Never   Vaping Use    Vaping Use: Never used   Substance and Sexual Activity    Alcohol use: Not Currently    Drug use: Never    Sexual activity: Yes   Other Topics Concern    Not on file   Social History Narrative    Not on file     Social Determinants of Health     Financial Resource Strain: Not on file   Food Insecurity: Not on file   Transportation Needs: Not on file   Physical Activity: Not on file   Stress: Not on file   Social Connections: Not on file   Intimate Partner Violence: Not on file   Housing Stability: Not on file       Current Outpatient Medications   Medication Sig Dispense Refill    levETIRAcetam (KEPPRA) 500 MG tablet

## 2024-04-29 RX ORDER — LEVETIRACETAM 500 MG/1
500 TABLET ORAL 2 TIMES DAILY
Qty: 60 TABLET | Refills: 11 | Status: SHIPPED | OUTPATIENT
Start: 2024-04-29

## 2024-04-29 NOTE — TELEPHONE ENCOUNTER
Requested Prescriptions     Pending Prescriptions Disp Refills    levETIRAcetam (KEPPRA) 500 MG tablet [Pharmacy Med Name: LEVETIRACETAM 500MG TABLETS] 60 tablet 11     Sig: TAKE 1 TABLET BY MOUTH TWICE DAILY       Last Office Visit: Visit date not found  Next Office Visit: Visit date not found  Last Medication Refill:02/28/2024

## 2024-11-28 ENCOUNTER — HOSPITAL ENCOUNTER (EMERGENCY)
Age: 34
Discharge: HOME | End: 2024-11-28
Attending: EMERGENCY MEDICINE
Payer: COMMERCIAL

## 2024-11-28 VITALS
SYSTOLIC BLOOD PRESSURE: 144 MMHG | TEMPERATURE: 97.7 F | HEART RATE: 85 BPM | BODY MASS INDEX: 37.97 KG/M2 | OXYGEN SATURATION: 95 % | WEIGHT: 280 LBS | DIASTOLIC BLOOD PRESSURE: 80 MMHG | RESPIRATION RATE: 17 BRPM

## 2024-11-28 DIAGNOSIS — G40.919 BREAKTHROUGH SEIZURE (HCC): Primary | ICD-10-CM

## 2024-11-28 LAB
ALBUMIN SERPL-MCNC: 4.5 G/DL (ref 3.5–5.2)
ALP SERPL-CCNC: 84 U/L (ref 40–129)
ALT SERPL-CCNC: 57 U/L (ref 5–41)
ANION GAP SERPL CALCULATED.3IONS-SCNC: 23 MMOL/L (ref 7–19)
AST SERPL-CCNC: 38 U/L (ref 5–40)
BASOPHILS # BLD: 0 K/UL (ref 0–0.2)
BASOPHILS NFR BLD: 0.4 % (ref 0–1)
BILIRUB SERPL-MCNC: 0.2 MG/DL (ref 0.2–1.2)
BUN SERPL-MCNC: 10 MG/DL (ref 6–20)
CALCIUM SERPL-MCNC: 9.7 MG/DL (ref 8.6–10)
CHLORIDE SERPL-SCNC: 101 MMOL/L (ref 98–111)
CO2 SERPL-SCNC: 17 MMOL/L (ref 22–29)
CREAT SERPL-MCNC: 0.9 MG/DL (ref 0.7–1.2)
EOSINOPHIL # BLD: 0.2 K/UL (ref 0–0.6)
EOSINOPHIL NFR BLD: 2.6 % (ref 0–5)
ERYTHROCYTE [DISTWIDTH] IN BLOOD BY AUTOMATED COUNT: 12.9 % (ref 11.5–14.5)
GLUCOSE SERPL-MCNC: 118 MG/DL (ref 70–99)
HCT VFR BLD AUTO: 46.2 % (ref 42–52)
HGB BLD-MCNC: 15 G/DL (ref 14–18)
IMM GRANULOCYTES # BLD: 0.1 K/UL
LYMPHOCYTES # BLD: 2.6 K/UL (ref 1.1–4.5)
LYMPHOCYTES NFR BLD: 28.5 % (ref 20–40)
MAGNESIUM SERPL-MCNC: 1.8 MG/DL (ref 1.6–2.6)
MCH RBC QN AUTO: 30.9 PG (ref 27–31)
MCHC RBC AUTO-ENTMCNC: 32.5 G/DL (ref 33–37)
MCV RBC AUTO: 95.3 FL (ref 80–94)
MONOCYTES # BLD: 0.7 K/UL (ref 0–0.9)
MONOCYTES NFR BLD: 7.5 % (ref 0–10)
NEUTROPHILS # BLD: 5.6 K/UL (ref 1.5–7.5)
NEUTS SEG NFR BLD: 60.5 % (ref 50–65)
PLATELET # BLD AUTO: 224 K/UL (ref 130–400)
PMV BLD AUTO: 11.3 FL (ref 9.4–12.4)
POTASSIUM SERPL-SCNC: 4.3 MMOL/L (ref 3.5–5)
PROT SERPL-MCNC: 7.4 G/DL (ref 6.4–8.3)
RBC # BLD AUTO: 4.85 M/UL (ref 4.7–6.1)
SODIUM SERPL-SCNC: 141 MMOL/L (ref 136–145)
WBC # BLD AUTO: 9.2 K/UL (ref 4.8–10.8)

## 2024-11-28 PROCEDURE — 99284 EMERGENCY DEPT VISIT MOD MDM: CPT

## 2024-11-28 PROCEDURE — 85025 COMPLETE CBC W/AUTO DIFF WBC: CPT

## 2024-11-28 PROCEDURE — 96374 THER/PROPH/DIAG INJ IV PUSH: CPT

## 2024-11-28 PROCEDURE — 36415 COLL VENOUS BLD VENIPUNCTURE: CPT

## 2024-11-28 PROCEDURE — 80053 COMPREHEN METABOLIC PANEL: CPT

## 2024-11-28 PROCEDURE — 6360000002 HC RX W HCPCS: Performed by: EMERGENCY MEDICINE

## 2024-11-28 PROCEDURE — 2580000003 HC RX 258: Performed by: EMERGENCY MEDICINE

## 2024-11-28 PROCEDURE — 83735 ASSAY OF MAGNESIUM: CPT

## 2024-11-28 RX ORDER — LEVETIRACETAM 500 MG/5ML
1000 INJECTION, SOLUTION, CONCENTRATE INTRAVENOUS ONCE
Status: COMPLETED | OUTPATIENT
Start: 2024-11-28 | End: 2024-11-28

## 2024-11-28 RX ORDER — LEVETIRACETAM 750 MG/1
750 TABLET ORAL 2 TIMES DAILY
Qty: 60 TABLET | Refills: 3 | Status: SHIPPED | OUTPATIENT
Start: 2024-11-28

## 2024-11-28 RX ORDER — 0.9 % SODIUM CHLORIDE 0.9 %
1000 INTRAVENOUS SOLUTION INTRAVENOUS ONCE
Status: COMPLETED | OUTPATIENT
Start: 2024-11-28 | End: 2024-11-28

## 2024-11-28 RX ADMIN — LEVETIRACETAM 1000 MG: 100 INJECTION INTRAVENOUS at 14:22

## 2024-11-28 RX ADMIN — SODIUM CHLORIDE 1000 ML: 9 INJECTION, SOLUTION INTRAVENOUS at 14:22

## 2024-11-28 ASSESSMENT — ENCOUNTER SYMPTOMS
NAUSEA: 0
SHORTNESS OF BREATH: 0
VOMITING: 0
COUGH: 0
BACK PAIN: 0
ABDOMINAL PAIN: 0
DIARRHEA: 0

## 2024-11-28 NOTE — ED PROVIDER NOTES
[facility] EMERGENCY DEPT  eMERGENCY dEPARTMENT eNCOUnter      Pt Name: Tio Chang  MRN: [id number]  Birthdate 1990  Date of evaluation: 11/28/2024  Provider: ARNALDO LOPEZ MD    CHIEF COMPLAINT       Chief Complaint   Patient presents with    Seizures     5 minute seizure today, post-ictal for EMS; hx of seizures, 1st seizure in 1.5 years         HISTORY OF PRESENT ILLNESS   (Location/Symptom, Timing/Onset,Context/Setting, Quality, Duration, Modifying Factors, Severity)  Note limiting factors.   Tio Chang is a 34 y.o. male who presents to the emergency department for concern of seizure.  Patient was sitting at the table having Thanksgiving dinner with his family when he had approximately a 5-minute generalized tonic-clonic seizure episode today.  Was postictal with EMS now back to baseline.  Prior history of seizures on Keppra had first seizure around age 12 and no further episodes until 1-1/2 years ago he had another seizure and none since then.  Denies any head trauma.  No headache.  No complaints currently.  Had his Keppra today and is compliant with his medications.    HPI    NursingNotes were reviewed.    REVIEW OF SYSTEMS    (2-9 systems for level 4, 10 or more for level 5)     Review of Systems   Constitutional:  Negative for chills and fever.   Respiratory:  Negative for cough and shortness of breath.    Cardiovascular:  Negative for chest pain.   Gastrointestinal:  Negative for abdominal pain, diarrhea, nausea and vomiting.   Genitourinary:  Negative for dysuria and frequency.   Musculoskeletal:  Negative for back pain and neck pain.   Neurological:  Positive for seizures. Negative for dizziness and headaches.            PAST MEDICALHISTORY     Past Medical History:   Diagnosis Date    Absence seizure disorder (HCC)     Altered mental status          SURGICAL HISTORY     History reviewed. No pertinent surgical history.      CURRENT MEDICATIONS     Previous Medications    No medications on file       ALLERGIES

## 2025-03-12 ENCOUNTER — OFFICE VISIT (OUTPATIENT)
Dept: NEUROLOGY | Age: 35
End: 2025-03-12
Payer: COMMERCIAL

## 2025-03-12 VITALS
HEART RATE: 88 BPM | HEIGHT: 72 IN | DIASTOLIC BLOOD PRESSURE: 78 MMHG | SYSTOLIC BLOOD PRESSURE: 138 MMHG | OXYGEN SATURATION: 99 % | WEIGHT: 280 LBS | BODY MASS INDEX: 37.93 KG/M2

## 2025-03-12 DIAGNOSIS — G40.909 SEIZURE DISORDER (HCC): Primary | ICD-10-CM

## 2025-03-12 PROCEDURE — 99214 OFFICE O/P EST MOD 30 MIN: CPT | Performed by: PSYCHIATRY & NEUROLOGY

## 2025-03-12 RX ORDER — LEVETIRACETAM 750 MG/1
750 TABLET ORAL 2 TIMES DAILY
Qty: 60 TABLET | Refills: 11 | Status: SHIPPED | OUTPATIENT
Start: 2025-03-12

## 2025-03-12 NOTE — PROGRESS NOTES
present  [x]No rigidity or bradykinesia noted  COMMENTS:   Sensory  [x]Sensation intact to light touch, pin prick, vibration, and proprioception BLE  []Sensation intact to light touch, pin prick, vibration, and proprioception BUE  COMMENTS:   Coordination [x]FTN normal bilaterally   []HTS normal bilaterally  []SILVIA normal bilaterally.   COMMENTS:   Reflexes  [x]Symmetric and non-pathological  [x]Toes down going bilaterally  [x]No clonus present  COMMENTS:   Gait                  [x]Normal steady gait    []Ataxic    []Spastic     []Magnetic     []Shuffling  COMMENTS:       LABS RECORD AND IMAGING REVIEW (As below and per HPI)      Lab Results   Component Value Date    WBC 9.2 11/28/2024    HGB 15.0 11/28/2024    HCT 46.2 11/28/2024    MCV 95.3 (H) 11/28/2024     11/28/2024     Lab Results   Component Value Date     11/28/2024    K 4.3 11/28/2024     11/28/2024    CO2 17 (L) 11/28/2024    BUN 10 11/28/2024    CREATININE 0.9 11/28/2024    GLUCOSE 118 (H) 11/28/2024    CALCIUM 9.7 11/28/2024    BILITOT 0.2 11/28/2024    ALKPHOS 84 11/28/2024    AST 38 11/28/2024    ALT 57 (H) 11/28/2024    LABGLOM >90 11/28/2024       MRI BRAIN W WO CONTRAST    Result Date: 2/2/2023  NO PRIOR REPORT AVAILABLE Exam: MRI OF THE BRAIN WITHOUT AND WITHINTRAVENOUS CONTRAST Clinical data:Possible seizure. History of seizures as a child. Technique: Multiplanar multi-sequence MRI of the brain without and with intravenous gadolinium. Diffusion-weighted imaging is submitted. Contrast: MultiHhance. Amount: 100 mL. Prior studies: No prior studies submitted. Findings: No abnormal parenchymal signal is present. No acute intracranial abnormality, specifically, cortical infarction, hemorrhage, mass or mass effect is seen. The ventricular system is normal in size, shape, and contour. No abnormal extra-axial fluid collections are present. The marrow signal within the skull base and calvarium is intact. Scattered mild paranasal sinus

## 2025-07-22 ENCOUNTER — TELEPHONE (OUTPATIENT)
Dept: NEUROLOGY | Age: 35
End: 2025-07-22

## 2025-07-22 NOTE — TELEPHONE ENCOUNTER
Called patient had to leave a vm asking patient to please return my call @ [phone] please this is regarding upcoming appt on 8-5-2025